# Patient Record
Sex: FEMALE | Race: OTHER | NOT HISPANIC OR LATINO
[De-identification: names, ages, dates, MRNs, and addresses within clinical notes are randomized per-mention and may not be internally consistent; named-entity substitution may affect disease eponyms.]

---

## 2020-07-31 ENCOUNTER — TRANSCRIPTION ENCOUNTER (OUTPATIENT)
Age: 40
End: 2020-07-31

## 2020-07-31 ENCOUNTER — APPOINTMENT (OUTPATIENT)
Dept: INTERNAL MEDICINE | Facility: CLINIC | Age: 40
End: 2020-07-31
Payer: MEDICAID

## 2020-07-31 VITALS
WEIGHT: 128 LBS | OXYGEN SATURATION: 100 % | HEIGHT: 66 IN | BODY MASS INDEX: 20.57 KG/M2 | SYSTOLIC BLOOD PRESSURE: 125 MMHG | DIASTOLIC BLOOD PRESSURE: 79 MMHG | TEMPERATURE: 98.5 F | HEART RATE: 90 BPM

## 2020-07-31 DIAGNOSIS — Z78.9 OTHER SPECIFIED HEALTH STATUS: ICD-10-CM

## 2020-07-31 PROCEDURE — 99203 OFFICE O/P NEW LOW 30 MIN: CPT

## 2020-07-31 NOTE — HISTORY OF PRESENT ILLNESS
[FreeTextEntry8] : c/o right breast lump  x 3/2020\par getting bigger \par size varies pending menses\par recently ecchymoses overlying lump\par occasional itchiness\par non tender\par no trauma\par no redness/swelling/vesicle/nipple discharge\par no nipple discharge \par no hormones/BCP\par no SOB/cough/hemoptysic/unexplained weight loss\par h/o right outer breast bx 2010- dx cyst\par FH breast cancer- maternal aunt and grand mother\par LMP yesterday\par OTC none\par work musician

## 2020-07-31 NOTE — REVIEW OF SYSTEMS
[Negative] : Psychiatric [Chest Pain] : no chest pain [Fever] : no fever [Shortness Of Breath] : no shortness of breath [Cough] : no cough [de-identified] : lump right breast

## 2020-07-31 NOTE — PHYSICAL EXAM
[Well Nourished] : well nourished [Normal Sclera/Conjunctiva] : normal sclera/conjunctiva [Normal Oropharynx] : the oropharynx was normal [No Lymphadenopathy] : no lymphadenopathy [Clear to Auscultation] : lungs were clear to auscultation bilaterally [Normal Rate] : normal rate  [Regular Rhythm] : with a regular rhythm [Pedal Pulses Present] : the pedal pulses are present [No Edema] : there was no peripheral edema [Non Tender] : non-tender [Soft] : abdomen soft [Normal] : no joint swelling and grossly normal strength and tone [No CVA Tenderness] : no CVA  tenderness [de-identified] : mobile  2 x 2 cm SQ mass inner lower right breast-  over lying ecchymoses- non tender- no nipple discharge - no mass axilla - normal left breast exam  [de-identified] : a

## 2020-08-07 ENCOUNTER — RESULT REVIEW (OUTPATIENT)
Age: 40
End: 2020-08-07

## 2020-08-07 ENCOUNTER — APPOINTMENT (OUTPATIENT)
Dept: ULTRASOUND IMAGING | Facility: CLINIC | Age: 40
End: 2020-08-07
Payer: MEDICAID

## 2020-08-07 ENCOUNTER — APPOINTMENT (OUTPATIENT)
Dept: MAMMOGRAPHY | Facility: CLINIC | Age: 40
End: 2020-08-07
Payer: MEDICAID

## 2020-08-07 ENCOUNTER — OUTPATIENT (OUTPATIENT)
Dept: OUTPATIENT SERVICES | Facility: HOSPITAL | Age: 40
LOS: 1 days | End: 2020-08-07

## 2020-08-07 PROCEDURE — 76641 ULTRASOUND BREAST COMPLETE: CPT | Mod: 26,50

## 2020-08-07 PROCEDURE — G0279: CPT | Mod: 26

## 2020-08-07 PROCEDURE — 77066 DX MAMMO INCL CAD BI: CPT | Mod: 26

## 2020-08-19 ENCOUNTER — APPOINTMENT (OUTPATIENT)
Dept: ULTRASOUND IMAGING | Facility: CLINIC | Age: 40
End: 2020-08-19
Payer: MEDICAID

## 2020-08-19 ENCOUNTER — RESULT REVIEW (OUTPATIENT)
Age: 40
End: 2020-08-19

## 2020-08-19 ENCOUNTER — OUTPATIENT (OUTPATIENT)
Dept: OUTPATIENT SERVICES | Facility: HOSPITAL | Age: 40
LOS: 1 days | End: 2020-08-19

## 2020-08-19 PROCEDURE — 88305 TISSUE EXAM BY PATHOLOGIST: CPT | Mod: 26

## 2020-08-19 PROCEDURE — 88360 TUMOR IMMUNOHISTOCHEM/MANUAL: CPT | Mod: 26,59

## 2020-08-19 PROCEDURE — 88344 IMHCHEM/IMCYTCHM EA MLT ANTB: CPT | Mod: 26,59

## 2020-08-19 PROCEDURE — 88342 IMHCHEM/IMCYTCHM 1ST ANTB: CPT | Mod: 26,59

## 2020-08-19 PROCEDURE — 88341 IMHCHEM/IMCYTCHM EA ADD ANTB: CPT | Mod: 26,59

## 2020-08-19 PROCEDURE — 88341 IMHCHEM/IMCYTCHM EA ADD ANTB: CPT | Mod: 59

## 2020-08-19 PROCEDURE — 88344 IMHCHEM/IMCYTCHM EA MLT ANTB: CPT | Mod: 59

## 2020-08-20 ENCOUNTER — RESULT REVIEW (OUTPATIENT)
Age: 40
End: 2020-08-20

## 2020-08-21 LAB — SURGICAL PATHOLOGY STUDY: SIGNIFICANT CHANGE UP

## 2020-08-27 ENCOUNTER — APPOINTMENT (OUTPATIENT)
Dept: BREAST CENTER | Facility: CLINIC | Age: 40
End: 2020-08-27
Payer: MEDICAID

## 2020-08-27 VITALS
HEART RATE: 90 BPM | HEIGHT: 66 IN | OXYGEN SATURATION: 99 % | DIASTOLIC BLOOD PRESSURE: 86 MMHG | SYSTOLIC BLOOD PRESSURE: 128 MMHG | BODY MASS INDEX: 20.09 KG/M2 | WEIGHT: 125 LBS | TEMPERATURE: 98.5 F

## 2020-08-27 DIAGNOSIS — Z80.3 FAMILY HISTORY OF MALIGNANT NEOPLASM OF BREAST: ICD-10-CM

## 2020-08-27 PROCEDURE — 99204 OFFICE O/P NEW MOD 45 MIN: CPT

## 2020-08-27 NOTE — REASON FOR VISIT
[Consultation] : a consultation visit [FreeTextEntry1] : newly diagnosed right breast invasive ductal carcinoma.

## 2020-08-27 NOTE — DATA REVIEWED
[FreeTextEntry1] : -- 8/7/2020 ([facility]) b/l dx mammo/US showing right 12:00 1cmFN 19 x 9 x 17mm solid nodule recommended for US core biopsy, right 4:00 4cmFN corresponding to palpable abnormality measuring 3.4 x 1.9 x 2.8cm for which US guided biopsy is recommended, solidy mass right 7:00 4cmFN measuring 13 x 5 x 13mm recc for US guided core bopsy, solid mass right 8:00 5cMFN measuring 4 x 5 x 5mm recc for US guided biopsy, right 9:00 5cmFN solid mass measuring 2.3 x 1.0 x 2.7cm for which US guided biopsy recc. A morphologically abnormal lymph node in right axilla with nodular thickened cortex measuring 3mm, recc for US guided biopsy. Left 2:00 4cmFN solid nodule measuring 7 x 10 x 6mm recc for US guided biopsy, several benign appearing solid nodules in left breast for which follow up can be performed pending biopsy results. BIRADS 4C\par \par -- 8/19/2020 ([facility]) right 4:00 4cmFN US biopsy pathology invasive ductal carcinoma with apocrine features, poorly differentiated, 1.8cm in greatest dimension. IHC: ER + [>95% 3+] [place] + [>95% 3+] HER2 negative [0%]. Right 7:00 5cmFN US core biopsy pathology fibroadenoma, Right 8:00 5cmFN US core biopsy pathology benign breast tissue with stromal fibrosis, right axilla US core biopsy pathology fragments of benign lymph node \par

## 2020-08-27 NOTE — PHYSICAL EXAM
[Normocephalic] : normocephalic [Atraumatic] : atraumatic [Supple] : supple [No Supraclavicular Adenopathy] : no supraclavicular adenopathy [Examined in the supine and seated position] : examined in the supine and seated position [No Nipple Retraction] : no left nipple retraction [No Axillary Lymphadenopathy] : no right axillary lymphadenopathy [No Nipple Discharge] : no left nipple discharge [No Edema] : no edema [No Rashes] : no rashes [No Ulceration] : no ulceration

## 2020-08-27 NOTE — HISTORY OF PRESENT ILLNESS
[FreeTextEntry1] : Jenn is a 39 year year old female referred by Dr. Jones/Esau presents for initial evaluation regarding newly diagnosed right 4:00 4cmFN invasive ductal carcinoma (ER + [>95% 3+] [place] + [>95% 3+] HER2 negative [0%]) poorly differentiated. In March the patient palpated a right breast lump at 4 o'clock, prompting imaging and subsequent biopsy. Patient had bleeding issue post biopsy, at that time I saw her at imaging. After pinpoint compression and ace bandage the bleeding stopped.\par \par Discussed patient's new diagnosis of cancer and her family history. Discussed local therapy which would include breast and axillary surgery, depending on results of MRI. Patient has bilateral breast biopsies pending, however will postpone those until after the MRI. Discussed the possibility of radiation therapy depending on the surgery and the axillary final path. Also discussed systemic therapy including endocrine therapy as her tumor is ER+, and chemotherapy depending on the Ki67 (which is pending), final pathology and medical oncology recommendations. 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 Discussed the Sozo noninvasive bioimpedance spectroscopy device which we will be able to measure and follow her after treatment, this way we can catch subclinical lymphedema and treat it before it becomes permanent. Patient understood.\par \par Discussed importance and implication of genetic testing in regards to her family history, new diagnosis of cancer at a young age and possibility of changing her treatment plan. Patient would like to go forward with genetic testing.\par

## 2020-08-27 NOTE — REVIEW OF SYSTEMS
[Breast Pain] : breast pain [Breast Lump] : breast lump [As Noted in HPI] : as noted in HPI [Negative] : Heme/Lymph [de-identified] : ecchymosis at biopsy site in the right breast; occasional and intermittent mild breast pain round the lump

## 2020-08-28 ENCOUNTER — APPOINTMENT (OUTPATIENT)
Dept: ULTRASOUND IMAGING | Facility: CLINIC | Age: 40
End: 2020-08-28

## 2020-08-31 ENCOUNTER — RESULT REVIEW (OUTPATIENT)
Age: 40
End: 2020-08-31

## 2020-08-31 ENCOUNTER — APPOINTMENT (OUTPATIENT)
Dept: MRI IMAGING | Facility: CLINIC | Age: 40
End: 2020-08-31
Payer: MEDICAID

## 2020-08-31 ENCOUNTER — OUTPATIENT (OUTPATIENT)
Dept: OUTPATIENT SERVICES | Facility: HOSPITAL | Age: 40
LOS: 1 days | End: 2020-08-31

## 2020-08-31 PROCEDURE — 77049 MRI BREAST C-+ W/CAD BI: CPT | Mod: 26

## 2020-09-01 DIAGNOSIS — R92.8 OTHER ABNORMAL AND INCONCLUSIVE FINDINGS ON DIAGNOSTIC IMAGING OF BREAST: ICD-10-CM

## 2020-09-15 ENCOUNTER — RESULT REVIEW (OUTPATIENT)
Age: 40
End: 2020-09-15

## 2020-09-15 ENCOUNTER — APPOINTMENT (OUTPATIENT)
Dept: ULTRASOUND IMAGING | Facility: CLINIC | Age: 40
End: 2020-09-15
Payer: MEDICAID

## 2020-09-15 ENCOUNTER — OUTPATIENT (OUTPATIENT)
Dept: OUTPATIENT SERVICES | Facility: HOSPITAL | Age: 40
LOS: 1 days | End: 2020-09-15

## 2020-09-15 ENCOUNTER — APPOINTMENT (OUTPATIENT)
Dept: ULTRASOUND IMAGING | Facility: CLINIC | Age: 40
End: 2020-09-15

## 2020-09-15 PROCEDURE — 88305 TISSUE EXAM BY PATHOLOGIST: CPT | Mod: 26

## 2020-09-15 PROCEDURE — 19083 BX BREAST 1ST LESION US IMAG: CPT | Mod: LT

## 2020-09-15 PROCEDURE — 88360 TUMOR IMMUNOHISTOCHEM/MANUAL: CPT | Mod: 26

## 2020-09-15 PROCEDURE — 88344 IMHCHEM/IMCYTCHM EA MLT ANTB: CPT | Mod: 26,59

## 2020-09-15 PROCEDURE — 19084 BX BREAST ADD LESION US IMAG: CPT | Mod: 59,RT

## 2020-09-15 PROCEDURE — 77066 DX MAMMO INCL CAD BI: CPT | Mod: 26

## 2020-09-15 PROCEDURE — 88342 IMHCHEM/IMCYTCHM 1ST ANTB: CPT | Mod: 26,59

## 2020-09-17 ENCOUNTER — OUTPATIENT (OUTPATIENT)
Dept: OUTPATIENT SERVICES | Facility: HOSPITAL | Age: 40
LOS: 1 days | End: 2020-09-17
Payer: COMMERCIAL

## 2020-09-17 LAB
GLUCOSE BLDC GLUCOMTR-MCNC: 91 MG/DL — SIGNIFICANT CHANGE UP (ref 70–99)
SURGICAL PATHOLOGY STUDY: SIGNIFICANT CHANGE UP

## 2020-09-17 PROCEDURE — 78815 PET IMAGE W/CT SKULL-THIGH: CPT

## 2020-09-17 PROCEDURE — A9552: CPT

## 2020-09-17 PROCEDURE — 78815 PET IMAGE W/CT SKULL-THIGH: CPT | Mod: 26

## 2020-09-17 PROCEDURE — 82962 GLUCOSE BLOOD TEST: CPT

## 2020-09-21 ENCOUNTER — APPOINTMENT (OUTPATIENT)
Dept: BREAST CENTER | Facility: CLINIC | Age: 40
End: 2020-09-21
Payer: MEDICAID

## 2020-09-21 VITALS — BODY MASS INDEX: 20.09 KG/M2 | WEIGHT: 125 LBS | HEIGHT: 66 IN | HEART RATE: 110 BPM | OXYGEN SATURATION: 98 %

## 2020-09-21 PROCEDURE — 99214 OFFICE O/P EST MOD 30 MIN: CPT

## 2020-09-21 NOTE — REASON FOR VISIT
[Follow-Up: _____] : a [unfilled] follow-up visit [FreeTextEntry1] :  right 4:00 4cmFN invasive ductal carcinoma (ER + [>95% 3+] [place] + [>95% 3+] HER2 negative [0%]) poorly differentiated.

## 2020-09-21 NOTE — HISTORY OF PRESENT ILLNESS
[FreeTextEntry1] : Jenn is a 39 year year old female, accompanied by her friend Beatriz (she is a primary care physician in [place]), presents for follow up evaluation regarding right 4:00 4cmFN invasive ductal carcinoma (ER + [>95% 3+] [place] + [>95% 3+] HER2 negative [0%]) poorly differentiated and right 12 o'clock ADH borderline DCIS.\par \par To note, patient has undergone additional biopsies in her right and left breast on 9/15/2020 of which pathology yielded right 12:00 1cmFN ADH bordering on low grade DCIS arising in background of FEA and involving complex sclerosing lesion. She has consulted with Dr. Mirella Samson and underwent a PET/CT scan which identified the cancer localized to the breast with no evidence of metastatic disease.  \par \par Discussed the recommendation after discussion with Dr. Samson for proceeding with surgical treatment. \par

## 2020-09-21 NOTE — DATA REVIEWED
[FreeTextEntry1] : -- 8/7/2020 ([facility]) b/l dx mammo/US showing right 12:00 1cmFN 19 x 9 x 17mm solid nodule recommended for US core biopsy, right 4:00 4cmFN corresponding to palpable abnormality measuring 3.4 x 1.9 x 2.8cm for which US guided biopsy is recommended, solidy mass right 7:00 4cmFN measuring 13 x 5 x 13mm recc for US guided core bopsy, solid mass right 8:00 5cMFN measuring 4 x 5 x 5mm recc for US guided biopsy, right 9:00 5cmFN solid mass measuring 2.3 x 1.0 x 2.7cm for which US guided biopsy recc. A morphologically abnormal lymph node in right axilla with nodular thickened cortex measuring 3mm, recc for US guided biopsy. Left 2:00 4cmFN solid nodule measuring 7 x 10 x 6mm recc for US guided biopsy, several benign appearing solid nodules in left breast for which follow up can be performed pending biopsy results. BIRADS 4C\par \par -- 8/19/2020 ([facility]) right 4:00 4cmFN US biopsy pathology invasive ductal carcinoma with apocrine features, poorly differentiated, 1.8cm in greatest dimension. IHC: ER + [>95% 3+] [place] + [>95% 3+] HER2 negative [0%]. Right 7:00 5cmFN US core biopsy pathology fibroadenoma, Right 8:00 5cmFN US core biopsy pathology benign breast tissue with stromal fibrosis, right axilla US core biopsy pathology fragments of benign lymph node \par  \par --9/2/2020 ([facility]) b/l breast MRI showing known 3cm biopsy proven carcinoma at 4:00 position of right breast demonstrating minimal enhancement and evidence of hemorrhage. The mass extends from the chest wall to subcutaneous dense tissues with loss of fat planes but no overt invasion. 2 lobulated masses with benign enhancement in upper quadrant right breast recommended for US guided biopsies. BIRADS 6 \par \par --9/15/2020 ([facility]) right 12:00 1cmFN US guided biopsy pathology ADH bordering on low grade DCIS arising in background of FEA and involving complex sclerosing lesion. Right 9:00 5cmFN US guided biopsy pathology fibroadenomatoid changes and sclerosing adenosis. Left breast 2:00 4cmFN US guided biopsy pathology fibroadenoma\par \par --9/17/2020 ([facility]) PET/CT Skull to thigh showing FDG avid right breast mass consistent with breast cancer. Two other FDG avid lesions are seen in the lateral right breast and were recently biopsied. No metastatic disease.\par \par

## 2020-09-21 NOTE — PAST MEDICAL HISTORY
[Menstruating] : The patient is menstruating [Menarche Age ____] : age at menarche was [unfilled] [Definite ___ (Date)] : the last menstrual period was [unfilled] [Regular Cycle Intervals] : have been regular [Total Preg ___] : G[unfilled] [History of Hormone Replacement Treatment] : has no history of hormone replacement treatment [FreeTextEntry6] : none [FreeTextEntry7] : none

## 2020-09-21 NOTE — REVIEW OF SYSTEMS
[As Noted in HPI] : as noted in HPI [Breast Pain] : breast pain [Breast Lump] : breast lump [Negative] : Heme/Lymph [de-identified] : ecchymosis at biopsy site in the right breast; occasional and intermittent mild breast pain round the lump

## 2020-09-25 ENCOUNTER — NON-APPOINTMENT (OUTPATIENT)
Age: 40
End: 2020-09-25

## 2020-09-25 ENCOUNTER — APPOINTMENT (OUTPATIENT)
Dept: INTERNAL MEDICINE | Facility: CLINIC | Age: 40
End: 2020-09-25
Payer: MEDICAID

## 2020-09-25 ENCOUNTER — OUTPATIENT (OUTPATIENT)
Dept: OUTPATIENT SERVICES | Facility: HOSPITAL | Age: 40
LOS: 1 days | End: 2020-09-25
Payer: MEDICAID

## 2020-09-25 ENCOUNTER — APPOINTMENT (OUTPATIENT)
Dept: RADIOLOGY | Facility: CLINIC | Age: 40
End: 2020-09-25

## 2020-09-25 VITALS
DIASTOLIC BLOOD PRESSURE: 84 MMHG | HEART RATE: 84 BPM | SYSTOLIC BLOOD PRESSURE: 129 MMHG | BODY MASS INDEX: 19.77 KG/M2 | WEIGHT: 123 LBS | HEIGHT: 66 IN | TEMPERATURE: 98.7 F | OXYGEN SATURATION: 99 %

## 2020-09-25 DIAGNOSIS — Z01.818 ENCOUNTER FOR OTHER PREPROCEDURAL EXAMINATION: ICD-10-CM

## 2020-09-25 DIAGNOSIS — N63.10 UNSPECIFIED LUMP IN THE RIGHT BREAST, UNSPECIFIED QUADRANT: ICD-10-CM

## 2020-09-25 PROCEDURE — 36415 COLL VENOUS BLD VENIPUNCTURE: CPT

## 2020-09-25 PROCEDURE — 71046 X-RAY EXAM CHEST 2 VIEWS: CPT | Mod: 26

## 2020-09-25 PROCEDURE — 99214 OFFICE O/P EST MOD 30 MIN: CPT | Mod: 25

## 2020-09-25 PROCEDURE — 93000 ELECTROCARDIOGRAM COMPLETE: CPT

## 2020-09-27 PROBLEM — Z01.818 PRE-OP EVALUATION: Status: ACTIVE | Noted: 2020-09-25

## 2020-09-27 LAB
ALBUMIN SERPL ELPH-MCNC: 4.9 G/DL
ALP BLD-CCNC: 57 U/L
ALT SERPL-CCNC: 24 U/L
ANION GAP SERPL CALC-SCNC: 17 MMOL/L
APPEARANCE: ABNORMAL
APTT BLD: 34 SEC
AST SERPL-CCNC: 27 U/L
BACTERIA: NEGATIVE
BASOPHILS # BLD AUTO: 0.05 K/UL
BASOPHILS NFR BLD AUTO: 1.3 %
BILIRUB SERPL-MCNC: 0.5 MG/DL
BILIRUBIN URINE: NEGATIVE
BLOOD URINE: NEGATIVE
BUN SERPL-MCNC: 9 MG/DL
CALCIUM SERPL-MCNC: 9.5 MG/DL
CHLORIDE SERPL-SCNC: 104 MMOL/L
CO2 SERPL-SCNC: 20 MMOL/L
COLOR: ABNORMAL
CREAT SERPL-MCNC: 0.63 MG/DL
EOSINOPHIL # BLD AUTO: 0.13 K/UL
EOSINOPHIL NFR BLD AUTO: 3.4 %
GLUCOSE QUALITATIVE U: NEGATIVE
GLUCOSE SERPL-MCNC: 88 MG/DL
HCG SERPL QL: NEGATIVE
HCT VFR BLD CALC: 39.2 %
HGB BLD-MCNC: 12.6 G/DL
HYALINE CASTS: 3 /LPF
IMM GRANULOCYTES NFR BLD AUTO: 0.3 %
INR PPP: 0.96 RATIO
KETONES URINE: ABNORMAL
LEUKOCYTE ESTERASE URINE: NEGATIVE
LYMPHOCYTES # BLD AUTO: 1.54 K/UL
LYMPHOCYTES NFR BLD AUTO: 40.3 %
MAN DIFF?: NORMAL
MCHC RBC-ENTMCNC: 31.4 PG
MCHC RBC-ENTMCNC: 32.1 GM/DL
MCV RBC AUTO: 97.8 FL
MICROSCOPIC-UA: NORMAL
MONOCYTES # BLD AUTO: 0.31 K/UL
MONOCYTES NFR BLD AUTO: 8.1 %
NEUTROPHILS # BLD AUTO: 1.78 K/UL
NEUTROPHILS NFR BLD AUTO: 46.6 %
NITRITE URINE: NEGATIVE
PAPP-A SERPL-ACNC: <1 MIU/ML
PH URINE: 5.5
PLATELET # BLD AUTO: 248 K/UL
POTASSIUM SERPL-SCNC: 4.1 MMOL/L
PROT SERPL-MCNC: 7.3 G/DL
PROTEIN URINE: NORMAL
PT BLD: 11.3 SEC
RBC # BLD: 4.01 M/UL
RBC # FLD: 12.8 %
RED BLOOD CELLS URINE: 1 /HPF
SODIUM SERPL-SCNC: 141 MMOL/L
SPECIFIC GRAVITY URINE: 1.02
SQUAMOUS EPITHELIAL CELLS: 3 /HPF
UROBILINOGEN URINE: NORMAL
WBC # FLD AUTO: 3.82 K/UL
WHITE BLOOD CELLS URINE: 0 /HPF

## 2020-09-27 NOTE — ASSESSMENT
[High Risk Surgery - Intraperitoneal, Intrathoracic or Supringuinal Vascular Procedures] : High Risk Surgery - Intraperitoneal, Intrathoracic or Supringuinal Vascular Procedures - No (0) [Ischemic Heart Disease] : Ischemic Heart Disease - No (0) [Congestive Heart Failure] : Congestive Heart Failure - No (0) [Prior Cerebrovascular Accident or TIA] : Prior Cerebrovascular Accident or TIA - No (0) [Creatinine >= 2mg/dL (1 Point)] : Creatinine >= 2mg/dL - No (0) [Insulin-dependent Diabetic (1 Point)] : Insulin-dependent Diabetic - No (0) [0] : 0 , RCRI Class: I, Risk of Post-Op Cardiac Complications: 3.9%, 95% CI for Risk Estimate: 2.8% - 5.4% [Patient Optimized for Surgery] : Patient optimized for surgery [As per surgery] : as per surgery

## 2020-09-27 NOTE — PHYSICAL EXAM
[Well Nourished] : well nourished [Normal Sclera/Conjunctiva] : normal sclera/conjunctiva [Normal Oropharynx] : the oropharynx was normal [No Lymphadenopathy] : no lymphadenopathy [Clear to Auscultation] : lungs were clear to auscultation bilaterally [Normal Rate] : normal rate  [Regular Rhythm] : with a regular rhythm [Pedal Pulses Present] : the pedal pulses are present [No Edema] : there was no peripheral edema [Soft] : abdomen soft [Non Tender] : non-tender [No CVA Tenderness] : no CVA  tenderness [No Rash] : no rash [Normal] : affect was normal and insight and judgment were intact [de-identified] : ecchymoses/tenderness right upper inner breast  [de-identified] : a

## 2020-09-27 NOTE — HISTORY OF PRESENT ILLNESS
[No Pertinent Cardiac History] : no history of aortic stenosis, atrial fibrillation, coronary artery disease, recent myocardial infarction, or implantable device/pacemaker [No Pertinent Pulmonary History] : no history of asthma, COPD, sleep apnea, or smoking [No Adverse Anesthesia Reaction] : no adverse anesthesia reaction in self or family member [Chronic Anticoagulation] : no chronic anticoagulation [Chronic Kidney Disease] : no chronic kidney disease [Diabetes] : no diabetes [FreeTextEntry1] : right mastectomy, SLNB [FreeTextEntry2] : 10/1/2020 [FreeTextEntry3] : Dr Otero

## 2020-10-01 ENCOUNTER — TRANSCRIPTION ENCOUNTER (OUTPATIENT)
Age: 40
End: 2020-10-01

## 2020-10-01 ENCOUNTER — OUTPATIENT (OUTPATIENT)
Dept: OUTPATIENT SERVICES | Facility: HOSPITAL | Age: 40
LOS: 1 days | End: 2020-10-01
Payer: COMMERCIAL

## 2020-10-01 ENCOUNTER — RESULT REVIEW (OUTPATIENT)
Age: 40
End: 2020-10-01

## 2020-10-01 PROCEDURE — 38792 RA TRACER ID OF SENTINL NODE: CPT

## 2020-10-02 ENCOUNTER — RESULT REVIEW (OUTPATIENT)
Age: 40
End: 2020-10-02

## 2020-10-02 ENCOUNTER — APPOINTMENT (OUTPATIENT)
Dept: BREAST CENTER | Facility: CLINIC | Age: 40
End: 2020-10-02

## 2020-10-02 ENCOUNTER — OUTPATIENT (OUTPATIENT)
Dept: OUTPATIENT SERVICES | Facility: HOSPITAL | Age: 40
LOS: 1 days | Discharge: HOME | End: 2020-10-02
Payer: MEDICAID

## 2020-10-02 PROCEDURE — A9541: CPT

## 2020-10-02 PROCEDURE — 88331 PATH CONSLTJ SURG 1 BLK 1SPC: CPT | Mod: 26

## 2020-10-02 PROCEDURE — 19303 MAST SIMPLE COMPLETE: CPT | Mod: RT,GC

## 2020-10-02 PROCEDURE — 38525 BIOPSY/REMOVAL LYMPH NODES: CPT | Mod: RT,GC

## 2020-10-02 PROCEDURE — 38792 RA TRACER ID OF SENTINL NODE: CPT

## 2020-10-02 PROCEDURE — 88305 TISSUE EXAM BY PATHOLOGIST: CPT | Mod: 26

## 2020-10-02 PROCEDURE — 88307 TISSUE EXAM BY PATHOLOGIST: CPT | Mod: 26

## 2020-10-12 ENCOUNTER — APPOINTMENT (OUTPATIENT)
Dept: BREAST CENTER | Facility: CLINIC | Age: 40
End: 2020-10-12
Payer: MEDICAID

## 2020-10-12 DIAGNOSIS — Z80.3 FAMILY HISTORY OF MALIGNANT NEOPLASM OF BREAST: ICD-10-CM

## 2020-10-12 LAB — SURGICAL PATHOLOGY STUDY: SIGNIFICANT CHANGE UP

## 2020-10-12 PROCEDURE — 99024 POSTOP FOLLOW-UP VISIT: CPT

## 2020-10-12 NOTE — PHYSICAL EXAM
[Normocephalic] : normocephalic [Atraumatic] : atraumatic [Supple] : supple [No Supraclavicular Adenopathy] : no supraclavicular adenopathy [Examined in the supine and seated position] : examined in the supine and seated position [No Nipple Retraction] : no left nipple retraction [No Nipple Discharge] : no left nipple discharge [No Axillary Lymphadenopathy] : no left axillary lymphadenopathy [No Edema] : no edema [No Rashes] : no rashes [No Ulceration] : no ulceration [No dominant masses] : no dominant masses in right breast  [No dominant masses] : no dominant masses left breast

## 2020-10-12 NOTE — DATA REVIEWED
[FreeTextEntry1] : -- 8/7/2020 ([facility]) b/l dx mammo/US showing right 12:00 1cmFN 19 x 9 x 17mm solid nodule recommended for US core biopsy, right 4:00 4cmFN corresponding to palpable abnormality measuring 3.4 x 1.9 x 2.8cm for which US guided biopsy is recommended, solidy mass right 7:00 4cmFN measuring 13 x 5 x 13mm recc for US guided core bopsy, solid mass right 8:00 5cMFN measuring 4 x 5 x 5mm recc for US guided biopsy, right 9:00 5cmFN solid mass measuring 2.3 x 1.0 x 2.7cm for which US guided biopsy recc. A morphologically abnormal lymph node in right axilla with nodular thickened cortex measuring 3mm, recc for US guided biopsy. Left 2:00 4cmFN solid nodule measuring 7 x 10 x 6mm recc for US guided biopsy, several benign appearing solid nodules in left breast for which follow up can be performed pending biopsy results. BIRADS 4C\par \par -- 8/19/2020 ([facility]) right 4:00 4cmFN US biopsy pathology invasive ductal carcinoma with apocrine features, poorly differentiated, 1.8cm in greatest dimension. IHC: ER + [>95% 3+] [place] + [>95% 3+] HER2 negative [0%]. Right 7:00 5cmFN US core biopsy pathology fibroadenoma, Right 8:00 5cmFN US core biopsy pathology benign breast tissue with stromal fibrosis, right axilla US core biopsy pathology fragments of benign lymph node \par  \par --9/2/2020 ([facility]) b/l breast MRI showing known 3cm biopsy proven carcinoma at 4:00 position of right breast demonstrating minimal enhancement and evidence of hemorrhage. The mass extends from the chest wall to subcutaneous dense tissues with loss of fat planes but no overt invasion. 2 lobulated masses with benign enhancement in upper quadrant right breast recommended for US guided biopsies. BIRADS 6 \par \par --9/15/2020 ([facility]) right 12:00 1cmFN US guided biopsy pathology ADH bordering on low grade DCIS arising in background of FEA and involving complex sclerosing lesion. Right 9:00 5cmFN US guided biopsy pathology fibroadenomatoid changes and sclerosing adenosis. Left breast 2:00 4cmFN US guided biopsy pathology fibroadenoma\par \par --9/17/2020 ([facility]) PET/CT Skull to thigh showing FDG avid right breast mass consistent with breast cancer. Two other FDG avid lesions are seen in the lateral right breast and were recently biopsied. No metastatic disease.\par \par --10/2/2020 final surgical pathology PENDING\par \par \par \par Baseline L-dx measurements obtained today \par

## 2020-10-12 NOTE — REASON FOR VISIT
[Post Op: _________] : a [unfilled] post op visit [FreeTextEntry1] : s/p right mastectomy with SLNB on 10/2/2020 for treatment of right 4:00 4cmFN invasive ductal carcinoma (ER + [>95% 3+] [place] + [>95% 3+] HER2 negative [0%]) poorly differentiated and right 12 o'clock ADH borderline DCIS. Final surgical pathology pending

## 2020-10-12 NOTE — REVIEW OF SYSTEMS
[As Noted in HPI] : as noted in HPI [Breast Pain] : breast pain [Breast Lump] : breast lump [Negative] : Heme/Lymph [de-identified] : ecchymosis at biopsy site in the right breast; occasional and intermittent mild breast pain round the lump

## 2020-10-12 NOTE — HISTORY OF PRESENT ILLNESS
[FreeTextEntry1] : Jenn is a 39 year year old female who presents for post-op evaluation. She is s/p right mastectomy with SLNB on 10/2/2020 for treatment of right 4:00 4cmFN invasive ductal carcinoma (ER + [>95% 3+] [place] + [>95% 3+] HER2 negative [0%]) poorly differentiated and right 12 o'clock ADH borderline DCIS. Final surgical pathology showed 2.4cm right IDC, moderately differentiated with negative margins, 0/3 SLNs. She is doing well, overall, PORFIRIO drain with minimal output. Denies fever and chills.\par \par PORFIRIO drain was removed today.\par \par Prior to surgery, she met with Dr. Mirella Samson and underwent a PET/CT scan which identified the cancer localized to the breast with no evidence of metastatic disease.  Patient has follow up with her this week.\par \par Patient will also follow up with Dr. Madden, for a delayed reconstruction.\par \par \par

## 2020-10-28 ENCOUNTER — NON-APPOINTMENT (OUTPATIENT)
Age: 40
End: 2020-10-28

## 2020-11-16 ENCOUNTER — APPOINTMENT (OUTPATIENT)
Dept: BREAST CENTER | Facility: CLINIC | Age: 40
End: 2020-11-16
Payer: MEDICAID

## 2020-11-16 VITALS — OXYGEN SATURATION: 97 % | WEIGHT: 123 LBS | HEIGHT: 66 IN | BODY MASS INDEX: 19.77 KG/M2 | HEART RATE: 76 BPM

## 2020-11-16 PROCEDURE — 99024 POSTOP FOLLOW-UP VISIT: CPT

## 2020-11-16 NOTE — REASON FOR VISIT
[Post Op: _________] : a [unfilled] post op visit [FreeTextEntry1] :  right 4:00 4cmFN invasive ductal carcinoma moderately differentiated, 2.4cm in size, ER >95%, [place] >95% HER2 negative and right 12 o'clock ADH borderline DCIS. S/p right mastectomy with SLNB, 0/3 lymph nodes were positive for disease. s/p right mastectomy and right SLN

## 2020-11-16 NOTE — REVIEW OF SYSTEMS
[As Noted in HPI] : as noted in HPI [Breast Pain] : breast pain [Breast Lump] : breast lump [Negative] : Heme/Lymph [de-identified] : ecchymosis at biopsy site in the right breast; occasional and intermittent mild breast pain round the lump

## 2020-11-16 NOTE — DATA REVIEWED
[FreeTextEntry1] : -- 8/7/2020 ([facility]) b/l dx mammo/US showing right 12:00 1cmFN 19 x 9 x 17mm solid nodule recommended for US core biopsy, right 4:00 4cmFN corresponding to palpable abnormality measuring 3.4 x 1.9 x 2.8cm for which US guided biopsy is recommended, solidy mass right 7:00 4cmFN measuring 13 x 5 x 13mm recc for US guided core bopsy, solid mass right 8:00 5cMFN measuring 4 x 5 x 5mm recc for US guided biopsy, right 9:00 5cmFN solid mass measuring 2.3 x 1.0 x 2.7cm for which US guided biopsy recc. A morphologically abnormal lymph node in right axilla with nodular thickened cortex measuring 3mm, recc for US guided biopsy. Left 2:00 4cmFN solid nodule measuring 7 x 10 x 6mm recc for US guided biopsy, several benign appearing solid nodules in left breast for which follow up can be performed pending biopsy results. BIRADS 4C\par \par -- 8/19/2020 ([facility]) right 4:00 4cmFN US biopsy pathology invasive ductal carcinoma with apocrine features, poorly differentiated, 1.8cm in greatest dimension. IHC: ER + [>95% 3+] [place] + [>95% 3+] HER2 negative [0%]. Right 7:00 5cmFN US core biopsy pathology fibroadenoma, Right 8:00 5cmFN US core biopsy pathology benign breast tissue with stromal fibrosis, right axilla US core biopsy pathology fragments of benign lymph node \par  \par --9/2/2020 ([facility]) b/l breast MRI showing known 3cm biopsy proven carcinoma at 4:00 position of right breast demonstrating minimal enhancement and evidence of hemorrhage. The mass extends from the chest wall to subcutaneous dense tissues with loss of fat planes but no overt invasion. 2 lobulated masses with benign enhancement in upper quadrant right breast recommended for US guided biopsies. BIRADS 6 \par \par --9/15/2020 ([facility]) right 12:00 1cmFN US guided biopsy pathology ADH bordering on low grade DCIS arising in background of FEA and involving complex sclerosing lesion. Right 9:00 5cmFN US guided biopsy pathology fibroadenomatoid changes and sclerosing adenosis. Left breast 2:00 4cmFN US guided biopsy pathology fibroadenoma\par \par --9/17/2020 ([facility]) PET/CT Skull to thigh showing FDG avid right breast mass consistent with breast cancer. Two other FDG avid lesions are seen in the lateral right breast and were recently biopsied. No metastatic disease.\par \par --10/2/2020 final surgical pathology PENDING\par \par \par \par Baseline L-dx measurements obtained today \par

## 2020-11-16 NOTE — HISTORY OF PRESENT ILLNESS
[FreeTextEntry1] : Jenn is a 39 year year old female who presents for post-op evaluation for right 4:00 4cmFN invasive ductal carcinoma moderately differentiated, 2.4cm in size, ER >95%, [place] >95% HER2 negative and right 12 o'clock ADH borderline DCIS. S/p right mastectomy with SLNB, 0/3 lymph nodes were positive for disease on 10/2/2020. She is doing well, reports some tightness with ROM and with playing the violin. Denies fever and chills.\par \par She has followed up Dr. Mirella Samson who sent for Oncotype, which is pending. She underwent a PET/CT scan which identified the cancer localized to the breast with no evidence of metastatic disease.\par \par Patient will also follow up with Dr. Madden, for a delayed reconstruction.

## 2020-11-16 NOTE — PHYSICAL EXAM
[Normocephalic] : normocephalic [Atraumatic] : atraumatic [Supple] : supple [No Supraclavicular Adenopathy] : no supraclavicular adenopathy [Examined in the supine and seated position] : examined in the supine and seated position [No dominant masses] : no dominant masses left breast [No Nipple Retraction] : no left nipple retraction [No Nipple Discharge] : no left nipple discharge [No Axillary Lymphadenopathy] : no left axillary lymphadenopathy [No Edema] : no edema [No Rashes] : no rashes [No Ulceration] : no ulceration [No dominant masses] : no dominant masses in right breast

## 2021-03-01 ENCOUNTER — APPOINTMENT (OUTPATIENT)
Dept: BREAST CENTER | Facility: CLINIC | Age: 41
End: 2021-03-01
Payer: MEDICAID

## 2021-03-01 VITALS — WEIGHT: 123 LBS | HEART RATE: 84 BPM | OXYGEN SATURATION: 99 % | BODY MASS INDEX: 19.77 KG/M2 | HEIGHT: 66 IN

## 2021-03-01 DIAGNOSIS — Z90.11 ACQUIRED ABSENCE OF RIGHT BREAST AND NIPPLE: ICD-10-CM

## 2021-03-01 PROCEDURE — 99072 ADDL SUPL MATRL&STAF TM PHE: CPT

## 2021-03-01 PROCEDURE — 99213 OFFICE O/P EST LOW 20 MIN: CPT

## 2021-03-01 RX ORDER — TAMOXIFEN CITRATE 20 MG/1
20 TABLET, FILM COATED ORAL
Refills: 0 | Status: ACTIVE | COMMUNITY

## 2021-03-01 RX ORDER — OXYCODONE 5 MG/1
5 TABLET ORAL
Qty: 20 | Refills: 0 | Status: DISCONTINUED | COMMUNITY
Start: 2020-10-02 | End: 2021-03-01

## 2021-03-02 NOTE — DATA REVIEWED
[FreeTextEntry1] : -- 8/7/2020 ([facility]) b/l dx mammo/US showing right 12:00 1cmFN 19 x 9 x 17mm solid nodule recommended for US core biopsy, right 4:00 4cmFN corresponding to palpable abnormality measuring 3.4 x 1.9 x 2.8cm for which US guided biopsy is recommended, solidy mass right 7:00 4cmFN measuring 13 x 5 x 13mm recc for US guided core bopsy, solid mass right 8:00 5cMFN measuring 4 x 5 x 5mm recc for US guided biopsy, right 9:00 5cmFN solid mass measuring 2.3 x 1.0 x 2.7cm for which US guided biopsy recc. A morphologically abnormal lymph node in right axilla with nodular thickened cortex measuring 3mm, recc for US guided biopsy. Left 2:00 4cmFN solid nodule measuring 7 x 10 x 6mm recc for US guided biopsy, several benign appearing solid nodules in left breast for which follow up can be performed pending biopsy results. BIRADS 4C\par \par -- 8/19/2020 ([facility]) right 4:00 4cmFN US biopsy pathology invasive ductal carcinoma with apocrine features, poorly differentiated, 1.8cm in greatest dimension. IHC: ER + [>95% 3+] [place] + [>95% 3+] HER2 negative [0%]. Right 7:00 5cmFN US core biopsy pathology fibroadenoma, Right 8:00 5cmFN US core biopsy pathology benign breast tissue with stromal fibrosis, right axilla US core biopsy pathology fragments of benign lymph node \par  \par --9/2/2020 ([facility]) b/l breast MRI showing known 3cm biopsy proven carcinoma at 4:00 position of right breast demonstrating minimal enhancement and evidence of hemorrhage. The mass extends from the chest wall to subcutaneous dense tissues with loss of fat planes but no overt invasion. 2 lobulated masses with benign enhancement in upper quadrant right breast recommended for US guided biopsies. BIRADS 6 \par \par --9/15/2020 ([facility]) right 12:00 1cmFN US guided biopsy pathology ADH bordering on low grade DCIS arising in background of FEA and involving complex sclerosing lesion. Right 9:00 5cmFN US guided biopsy pathology fibroadenomatoid changes and sclerosing adenosis. Left breast 2:00 4cmFN US guided biopsy pathology fibroadenoma\par \par --9/17/2020 ([facility]) PET/CT Skull to thigh showing FDG avid right breast mass consistent with breast cancer. Two other FDG avid lesions are seen in the lateral right breast and were recently biopsied. No metastatic disease.\par \par 10/12/2020 ([facility]) right mastectomy pathology: invasive moderately differentiated ductal carcinoma, 2.4cm in size. Margins negative for invasive carcinoma, 2mm from anterior margin, 3mm from deep margin, ALH. Right breast 4:00 margin: benign fibroadipose tissue. Right breast axillary tail: benign breast tissue. Right breast axillary node: two lymph nodes negative for tumor. Right SLN: one lymph node negative for tumor. \par \par Baseline L-dx measurements obtained \par

## 2021-03-02 NOTE — HISTORY OF PRESENT ILLNESS
[FreeTextEntry1] : Jenn is a 40 year year old female who presents for post-op evaluation for right 4:00 4cmFN invasive ductal carcinoma moderately differentiated, 2.4cm in size, ER >95%, [place] >95% HER2 negative and right 12 o'clock ADH borderline DCIS. S/p right nipple sparing mastectomy, with no recon, with SLNB, 0/3 lymph nodes were positive for disease on 10/2/2020. She is doing well, reports improvement with ongoing physical therapy and with playing the violin. Denies fever and chills. \par \par Oncotype of 23.  She started Tamoxifen on 2/22/2021 managed by Dr. Samson, but reports taking half a pill or one pill every two days secondary to headaches and feelings of swelling in her fingers. She underwent a PET/CT scan which identified the cancer localized to the breast with no evidence of metastatic disease.\par \par Patient currently does not wish to undergo reconstruction and inquired about a prosthetic bra.

## 2021-03-02 NOTE — REVIEW OF SYSTEMS
[As Noted in HPI] : as noted in HPI [Breast Pain] : breast pain [Breast Lump] : breast lump [Negative] : Heme/Lymph [de-identified] : ecchymosis at biopsy site in the right breast; occasional and intermittent mild breast pain round the lump

## 2021-06-07 ENCOUNTER — NON-APPOINTMENT (OUTPATIENT)
Age: 41
End: 2021-06-07

## 2021-06-07 ENCOUNTER — APPOINTMENT (OUTPATIENT)
Dept: BREAST CENTER | Facility: CLINIC | Age: 41
End: 2021-06-07

## 2021-06-07 VITALS — OXYGEN SATURATION: 98 % | HEART RATE: 93 BPM

## 2021-06-07 NOTE — REVIEW OF SYSTEMS
[As Noted in HPI] : as noted in HPI [Breast Pain] : breast pain [Breast Lump] : breast lump [Negative] : Heme/Lymph [de-identified] : ecchymosis at biopsy site in the right breast; occasional and intermittent mild breast pain round the lump

## 2021-06-07 NOTE — DATA REVIEWED
[FreeTextEntry1] : -- 8/7/2020 ([facility]) b/l dx mammo/US showing right 12:00 1cmFN 19 x 9 x 17mm solid nodule recommended for US core biopsy, right 4:00 4cmFN corresponding to palpable abnormality measuring 3.4 x 1.9 x 2.8cm for which US guided biopsy is recommended, solidy mass right 7:00 4cmFN measuring 13 x 5 x 13mm recc for US guided core bopsy, solid mass right 8:00 5cMFN measuring 4 x 5 x 5mm recc for US guided biopsy, right 9:00 5cmFN solid mass measuring 2.3 x 1.0 x 2.7cm for which US guided biopsy recc. A morphologically abnormal lymph node in right axilla with nodular thickened cortex measuring 3mm, recc for US guided biopsy. Left 2:00 4cmFN solid nodule measuring 7 x 10 x 6mm recc for US guided biopsy, several benign appearing solid nodules in left breast for which follow up can be performed pending biopsy results. BIRADS 4C\par \par -- 8/19/2020 ([facility]) right 4:00 4cmFN US biopsy pathology invasive ductal carcinoma with apocrine features, poorly differentiated, 1.8cm in greatest dimension. IHC: ER + [>95% 3+] [place] + [>95% 3+] HER2 negative [0%]. Right 7:00 5cmFN US core biopsy pathology fibroadenoma, Right 8:00 5cmFN US core biopsy pathology benign breast tissue with stromal fibrosis, right axilla US core biopsy pathology fragments of benign lymph node \par  \par --9/2/2020 ([facility]) b/l breast MRI showing known 3cm biopsy proven carcinoma at 4:00 position of right breast demonstrating minimal enhancement and evidence of hemorrhage. The mass extends from the chest wall to subcutaneous dense tissues with loss of fat planes but no overt invasion. 2 lobulated masses with benign enhancement in upper quadrant right breast recommended for US guided biopsies. BIRADS 6 \par \par --9/15/2020 ([facility]) right 12:00 1cmFN US guided biopsy pathology ADH bordering on low grade DCIS arising in background of FEA and involving complex sclerosing lesion. Right 9:00 5cmFN US guided biopsy pathology fibroadenomatoid changes and sclerosing adenosis. Left breast 2:00 4cmFN US guided biopsy pathology fibroadenoma\par \par --9/17/2020 ([facility]) PET/CT Skull to thigh showing FDG avid right breast mass consistent with breast cancer. Two other FDG avid lesions are seen in the lateral right breast and were recently biopsied. No metastatic disease.\par \par 10/12/2020 ([facility]) right mastectomy pathology: invasive moderately differentiated ductal carcinoma, 2.4cm in size. Margins negative for invasive carcinoma, 2mm from anterior margin, 3mm from deep margin, ALH. Right breast 4:00 margin: benign fibroadipose tissue. Right breast axillary tail: benign breast tissue. Right breast axillary node: two lymph nodes negative for tumor. Right SLN: one lymph node negative for tumor. \par \par Baseline L-dx measurements obtained \par

## 2021-06-07 NOTE — REASON FOR VISIT
[Follow-Up: _____] : a [unfilled] follow-up visit [FreeTextEntry1] : right 4:00 4cmFN invasive ductal carcinoma moderately differentiated, 2.4cm in size, ER >95%, [place] >95% HER2 negative and right 12 o'clock ADH borderline DCIS. S/p right mastectomy with SLNB, 0/3 lymph nodes were positive for disease. s/p right mastectomy and right SLN. \par

## 2021-06-07 NOTE — HISTORY OF PRESENT ILLNESS
[FreeTextEntry1] : Jenn is a 40 year year old  female who presents for follow up right 4:00 4cmFN invasive ductal carcinoma moderately differentiated, 2.4cm in size, ER >95%, [place] >95% HER2 negative and right 12 o'clock ADH borderline DCIS. S/p right nipple sparing mastectomy, with no recon, with SLNB, 0/3 lymph nodes were positive for disease on 10/2/2020. She is doing well, reports improvement with ongoing physical therapy and with playing the violin. Denies fever and chills. \par \par Oncotype of 23. She started Tamoxifen on 2/22/2021 managed by Dr. Samson, but reports taking half a pill or one pill every two days secondary to headaches and feelings of swelling in her fingers. She underwent a PET/CT scan which identified the cancer localized to the breast with no evidence of metastatic disease.\par \par Patient currently does not wish to undergo reconstruction and inquired about a prosthetic bra.

## 2021-07-28 NOTE — REASON FOR VISIT
Writer notes that patient is currently admitted to [facility] for low blood pressures. Will monitor for discharge.    [Follow-Up: _____] : a [unfilled] follow-up visit [FreeTextEntry1] : right 4:00 4cmFN invasive ductal carcinoma moderately differentiated, 2.4cm in size, ER >95%, [place] >95% HER2 negative and right 12 o'clock ADH borderline DCIS. S/p right mastectomy with SLNB, 0/3 lymph nodes were positive for disease. s/p right mastectomy and right SLN. \par

## 2021-08-02 ENCOUNTER — RESULT REVIEW (OUTPATIENT)
Age: 41
End: 2021-08-02

## 2021-08-02 ENCOUNTER — APPOINTMENT (OUTPATIENT)
Dept: ULTRASOUND IMAGING | Facility: CLINIC | Age: 41
End: 2021-08-02
Payer: MEDICAID

## 2021-08-02 ENCOUNTER — OUTPATIENT (OUTPATIENT)
Dept: OUTPATIENT SERVICES | Facility: HOSPITAL | Age: 41
LOS: 1 days | End: 2021-08-02

## 2021-08-02 ENCOUNTER — APPOINTMENT (OUTPATIENT)
Dept: MAMMOGRAPHY | Facility: CLINIC | Age: 41
End: 2021-08-02
Payer: MEDICAID

## 2021-08-02 ENCOUNTER — APPOINTMENT (OUTPATIENT)
Dept: MRI IMAGING | Facility: CLINIC | Age: 41
End: 2021-08-02
Payer: MEDICAID

## 2021-08-02 PROCEDURE — 76641 ULTRASOUND BREAST COMPLETE: CPT | Mod: 26,LT

## 2021-08-02 PROCEDURE — G0279: CPT | Mod: 26

## 2021-08-02 PROCEDURE — 77049 MRI BREAST C-+ W/CAD BI: CPT | Mod: 26

## 2021-08-02 PROCEDURE — 77065 DX MAMMO INCL CAD UNI: CPT | Mod: 26,LT

## 2021-08-03 ENCOUNTER — NON-APPOINTMENT (OUTPATIENT)
Age: 41
End: 2021-08-03

## 2021-08-09 ENCOUNTER — APPOINTMENT (OUTPATIENT)
Dept: BREAST CENTER | Facility: CLINIC | Age: 41
End: 2021-08-09
Payer: MEDICAID

## 2021-08-09 VITALS — BODY MASS INDEX: 19.77 KG/M2 | OXYGEN SATURATION: 98 % | HEIGHT: 66 IN | WEIGHT: 123 LBS | HEART RATE: 91 BPM

## 2021-08-09 PROCEDURE — 99213 OFFICE O/P EST LOW 20 MIN: CPT

## 2021-08-09 PROCEDURE — 99072 ADDL SUPL MATRL&STAF TM PHE: CPT

## 2021-08-12 NOTE — DATA REVIEWED
[FreeTextEntry1] : -- 8/7/2020 ([facility]) b/l dx mammo/US showing right 12:00 1cmFN 19 x 9 x 17mm solid nodule recommended for US core biopsy, right 4:00 4cmFN corresponding to palpable abnormality measuring 3.4 x 1.9 x 2.8cm for which US guided biopsy is recommended, solidy mass right 7:00 4cmFN measuring 13 x 5 x 13mm recc for US guided core bopsy, solid mass right 8:00 5cMFN measuring 4 x 5 x 5mm recc for US guided biopsy, right 9:00 5cmFN solid mass measuring 2.3 x 1.0 x 2.7cm for which US guided biopsy recc. A morphologically abnormal lymph node in right axilla with nodular thickened cortex measuring 3mm, recc for US guided biopsy. Left 2:00 4cmFN solid nodule measuring 7 x 10 x 6mm recc for US guided biopsy, several benign appearing solid nodules in left breast for which follow up can be performed pending biopsy results. BIRADS 4C\par \par -- 8/19/2020 ([facility]) right 4:00 4cmFN US biopsy pathology invasive ductal carcinoma with apocrine features, poorly differentiated, 1.8cm in greatest dimension. IHC: ER + [>95% 3+] [place] + [>95% 3+] HER2 negative [0%]. Right 7:00 5cmFN US core biopsy pathology fibroadenoma, Right 8:00 5cmFN US core biopsy pathology benign breast tissue with stromal fibrosis, right axilla US core biopsy pathology fragments of benign lymph node \par  \par --9/2/2020 ([facility]) b/l breast MRI showing known 3cm biopsy proven carcinoma at 4:00 position of right breast demonstrating minimal enhancement and evidence of hemorrhage. The mass extends from the chest wall to subcutaneous dense tissues with loss of fat planes but no overt invasion. 2 lobulated masses with benign enhancement in upper quadrant right breast recommended for US guided biopsies. BIRADS 6 \par \par --9/15/2020 ([facility]) right 12:00 1cmFN US guided biopsy pathology ADH bordering on low grade DCIS arising in background of FEA and involving complex sclerosing lesion. Right 9:00 5cmFN US guided biopsy pathology fibroadenomatoid changes and sclerosing adenosis. Left breast 2:00 4cmFN US guided biopsy pathology fibroadenoma\par \par --9/17/2020 ([facility]) PET/CT Skull to thigh showing FDG avid right breast mass consistent with breast cancer. Two other FDG avid lesions are seen in the lateral right breast and were recently biopsied. No metastatic disease.\par \par 10/12/2020 ([facility]) right mastectomy pathology: invasive moderately differentiated ductal carcinoma, 2.4cm in size. Margins negative for invasive carcinoma, 2mm from anterior margin, 3mm from deep margin, ALH. Right breast 4:00 margin: benign fibroadipose tissue. Right breast axillary tail: benign breast tissue. Right breast axillary node: two lymph nodes negative for tumor. Right SLN: one lymph node negative for tumor. \par \par Baseline L-dx measurements obtained \par \par 8/2/2021 ([facility]) bilateral breast MRI showing no suspicious enhancement to warrant biopsy, post right mastectomy, Benign BIRADS 2 \par \par 8/2/2021 ([facility]) left mammogram/US showing breast is extremely dense, - 2:00 4 cm from the nipple measuring 6 x 4 x 4 mm, intervally decreased since 8/2020 and previously biopsied as benign; 4:00 1 cm from the nipple measuring 4 x 2 x 5 mm, intervally decreased since 8/2020, indicative of benign etiology;11:00 6 cm from the nipple measuring 4 x 3 x 4 mm, intervally decreased since 8/2020, indicative of benign etiology; 11:00 4 cm from the nipple measuring 5 x 2 x 5 mm, intervally decreased since 8/2020, indicative of benign etiology. No mammographic or sonographic evidence of malignancy. Benign BIRADS 2 \par \par

## 2021-08-12 NOTE — REVIEW OF SYSTEMS
[As Noted in HPI] : as noted in HPI [Breast Pain] : breast pain [Breast Lump] : breast lump [Negative] : Heme/Lymph [de-identified] : ecchymosis at biopsy site in the right breast; occasional and intermittent mild breast pain round the lump

## 2021-08-12 NOTE — REASON FOR VISIT
[Follow-Up: _____] : a [unfilled] follow-up visit [FreeTextEntry1] : right 4:00 4cmFN invasive ductal carcinoma moderately differentiated, 2.4cm in size, ER >95%, [place] >95% HER2 negative and right 12 o'clock ADH borderline DCIS. S/p right mastectomy with SLNB, 0/3 lymph nodes were positive for disease. s/p right mastectomy and right SLN. \par

## 2021-08-12 NOTE — HISTORY OF PRESENT ILLNESS
[FreeTextEntry1] : Jenn is a 40 year year old  female who presents for follow up right 4:00 4cmFN invasive ductal carcinoma moderately differentiated, 2.4cm in size, ER >95%, [place] >95% HER2 negative and right 12 o'clock ADH borderline DCIS. S/p right nipple sparing mastectomy, with no recon, with SLNB, 0/3 lymph nodes were positive for disease on 10/2/2020. She is doing well, reports improvement with ongoing physical therapy and with playing the violin. Patient has no breast complaints today. Denies nipple discharge, nipple/breast skin changes or dimpling. Denies fever and chills. \par \par Oncotype of 23. She started Tamoxifen on 2/22/2021 managed by Dr. Samson, states she is doing well on it.\par \par Patient's Sozo continues to increase slightly, patient is asymptomatic. Discussed that if she feels heaviness she can wear her sleeve.

## 2021-11-05 ENCOUNTER — NON-APPOINTMENT (OUTPATIENT)
Age: 41
End: 2021-11-05

## 2021-11-08 ENCOUNTER — APPOINTMENT (OUTPATIENT)
Dept: BREAST CENTER | Facility: CLINIC | Age: 41
End: 2021-11-08

## 2021-11-08 VITALS — HEIGHT: 66 IN | BODY MASS INDEX: 19.29 KG/M2 | HEART RATE: 100 BPM | OXYGEN SATURATION: 100 % | WEIGHT: 120 LBS

## 2021-11-08 NOTE — REVIEW OF SYSTEMS
[As Noted in HPI] : as noted in HPI [Breast Pain] : breast pain [Breast Lump] : breast lump [Negative] : Heme/Lymph [de-identified] : ecchymosis at biopsy site in the right breast; occasional and intermittent mild breast pain round the lump

## 2021-11-08 NOTE — DATA REVIEWED
[FreeTextEntry1] : -- 8/7/2020 ([facility]) b/l dx mammo/US showing right 12:00 1cmFN 19 x 9 x 17mm solid nodule recommended for US core biopsy, right 4:00 4cmFN corresponding to palpable abnormality measuring 3.4 x 1.9 x 2.8cm for which US guided biopsy is recommended, solidy mass right 7:00 4cmFN measuring 13 x 5 x 13mm recc for US guided core bopsy, solid mass right 8:00 5cMFN measuring 4 x 5 x 5mm recc for US guided biopsy, right 9:00 5cmFN solid mass measuring 2.3 x 1.0 x 2.7cm for which US guided biopsy recc. A morphologically abnormal lymph node in right axilla with nodular thickened cortex measuring 3mm, recc for US guided biopsy. Left 2:00 4cmFN solid nodule measuring 7 x 10 x 6mm recc for US guided biopsy, several benign appearing solid nodules in left breast for which follow up can be performed pending biopsy results. BIRADS 4C\par \par -- 8/19/2020 ([facility]) right 4:00 4cmFN US biopsy pathology invasive ductal carcinoma with apocrine features, poorly differentiated, 1.8cm in greatest dimension. IHC: ER + [>95% 3+] [place] + [>95% 3+] HER2 negative [0%]. Right 7:00 5cmFN US core biopsy pathology fibroadenoma, Right 8:00 5cmFN US core biopsy pathology benign breast tissue with stromal fibrosis, right axilla US core biopsy pathology fragments of benign lymph node \par  \par --9/2/2020 ([facility]) b/l breast MRI showing known 3cm biopsy proven carcinoma at 4:00 position of right breast demonstrating minimal enhancement and evidence of hemorrhage. The mass extends from the chest wall to subcutaneous dense tissues with loss of fat planes but no overt invasion. 2 lobulated masses with benign enhancement in upper quadrant right breast recommended for US guided biopsies. BIRADS 6 \par \par --9/15/2020 ([facility]) right 12:00 1cmFN US guided biopsy pathology ADH bordering on low grade DCIS arising in background of FEA and involving complex sclerosing lesion. Right 9:00 5cmFN US guided biopsy pathology fibroadenomatoid changes and sclerosing adenosis. Left breast 2:00 4cmFN US guided biopsy pathology fibroadenoma\par \par --9/17/2020 ([facility]) PET/CT Skull to thigh showing FDG avid right breast mass consistent with breast cancer. Two other FDG avid lesions are seen in the lateral right breast and were recently biopsied. No metastatic disease.\par \par 10/12/2020 ([facility]) right mastectomy pathology: invasive moderately differentiated ductal carcinoma, 2.4cm in size. Margins negative for invasive carcinoma, 2mm from anterior margin, 3mm from deep margin, ALH. Right breast 4:00 margin: benign fibroadipose tissue. Right breast axillary tail: benign breast tissue. Right breast axillary node: two lymph nodes negative for tumor. Right SLN: one lymph node negative for tumor. \par \par Baseline L-dx measurements obtained \par \par 8/2/2021 ([facility]) bilateral breast MRI showing no suspicious enhancement to warrant biopsy, post right mastectomy, Benign BIRADS 2 \par \par 8/2/2021 ([facility]) left mammogram/US showing breast is extremely dense, - 2:00 4 cm from the nipple measuring 6 x 4 x 4 mm, intervally decreased since 8/2020 and previously biopsied as benign; 4:00 1 cm from the nipple measuring 4 x 2 x 5 mm, intervally decreased since 8/2020, indicative of benign etiology;11:00 6 cm from the nipple measuring 4 x 3 x 4 mm, intervally decreased since 8/2020, indicative of benign etiology; 11:00 4 cm from the nipple measuring 5 x 2 x 5 mm, intervally decreased since 8/2020, indicative of benign etiology. No mammographic or sonographic evidence of malignancy. Benign BIRADS 2 \par \par

## 2021-11-08 NOTE — REASON FOR VISIT
[Follow-Up: _____] : a [unfilled] follow-up visit [FreeTextEntry1] : right 4:00 4cmFN invasive ductal carcinoma moderately differentiated, 2.4cm in size, ER >95%, [place] >95% HER2 negative and right 12 o'clock ADH borderline DCIS. S/p right mastectomy with SLNB, 0/3 lymph nodes were positive for disease. s/p right mastectomy and right SLN. \par

## 2021-11-08 NOTE — HISTORY OF PRESENT ILLNESS
[FreeTextEntry1] : Jenn is a 40 year year old  female who presents for follow up right 4:00 4cmFN invasive ductal carcinoma moderately differentiated, 2.4cm in size, ER >95%, [place] >95% HER2 negative and right 12 o'clock ADH borderline DCIS. S/p right nipple sparing mastectomy, with no recon, with SLNB, 0/3 lymph nodes were positive for disease on 10/2/2020. She is doing well, reports improvement with ongoing physical therapy and with playing the violin. Patient has no breast complaints today. Denies nipple discharge, nipple/breast skin changes or dimpling. Denies fever and chills. \par \par Oncotype of 23. She started Tamoxifen on 2/22/2021 managed by Dr. Samson, states she is doing well on it.\par \par Patient's Sozo continues to increase slightly, patient is asymptomatic. Discussed that if she feels heaviness she can wear her sleeve.

## 2022-02-07 ENCOUNTER — APPOINTMENT (OUTPATIENT)
Dept: BREAST CENTER | Facility: CLINIC | Age: 42
End: 2022-02-07

## 2022-02-14 ENCOUNTER — APPOINTMENT (OUTPATIENT)
Dept: BREAST CENTER | Facility: CLINIC | Age: 42
End: 2022-02-14
Payer: MEDICAID

## 2022-02-14 ENCOUNTER — RESULT REVIEW (OUTPATIENT)
Age: 42
End: 2022-02-14

## 2022-02-14 VITALS — OXYGEN SATURATION: 98 % | WEIGHT: 123 LBS | HEIGHT: 66 IN | BODY MASS INDEX: 19.77 KG/M2 | HEART RATE: 102 BPM

## 2022-02-14 PROCEDURE — 99072 ADDL SUPL MATRL&STAF TM PHE: CPT

## 2022-02-14 PROCEDURE — 99213 OFFICE O/P EST LOW 20 MIN: CPT

## 2022-02-14 PROCEDURE — 93702 BIS XTRACELL FLUID ANALYSIS: CPT

## 2022-02-15 NOTE — REVIEW OF SYSTEMS
[As Noted in HPI] : as noted in HPI [Breast Pain] : breast pain [Breast Lump] : breast lump [Negative] : Heme/Lymph [de-identified] : ecchymosis at biopsy site in the right breast; occasional and intermittent mild breast pain round the lump

## 2022-02-15 NOTE — DATA REVIEWED
[FreeTextEntry1] : -- 8/7/2020 ([facility]) b/l dx mammo/US showing right 12:00 1cmFN 19 x 9 x 17mm solid nodule recommended for US core biopsy, right 4:00 4cmFN corresponding to palpable abnormality measuring 3.4 x 1.9 x 2.8cm for which US guided biopsy is recommended, solidy mass right 7:00 4cmFN measuring 13 x 5 x 13mm recc for US guided core bopsy, solid mass right 8:00 5cMFN measuring 4 x 5 x 5mm recc for US guided biopsy, right 9:00 5cmFN solid mass measuring 2.3 x 1.0 x 2.7cm for which US guided biopsy recc. A morphologically abnormal lymph node in right axilla with nodular thickened cortex measuring 3mm, recc for US guided biopsy. Left 2:00 4cmFN solid nodule measuring 7 x 10 x 6mm recc for US guided biopsy, several benign appearing solid nodules in left breast for which follow up can be performed pending biopsy results. BIRADS 4C\par \par -- 8/19/2020 ([facility]) right 4:00 4cmFN US biopsy pathology invasive ductal carcinoma with apocrine features, poorly differentiated, 1.8cm in greatest dimension. IHC: ER + [>95% 3+] [place] + [>95% 3+] HER2 negative [0%]. Right 7:00 5cmFN US core biopsy pathology fibroadenoma, Right 8:00 5cmFN US core biopsy pathology benign breast tissue with stromal fibrosis, right axilla US core biopsy pathology fragments of benign lymph node \par  \par --9/2/2020 ([facility]) b/l breast MRI showing known 3cm biopsy proven carcinoma at 4:00 position of right breast demonstrating minimal enhancement and evidence of hemorrhage. The mass extends from the chest wall to subcutaneous dense tissues with loss of fat planes but no overt invasion. 2 lobulated masses with benign enhancement in upper quadrant right breast recommended for US guided biopsies. BIRADS 6 \par \par --9/15/2020 ([facility]) right 12:00 1cmFN US guided biopsy pathology ADH bordering on low grade DCIS arising in background of FEA and involving complex sclerosing lesion. Right 9:00 5cmFN US guided biopsy pathology fibroadenomatoid changes and sclerosing adenosis. Left breast 2:00 4cmFN US guided biopsy pathology fibroadenoma\par \par --9/17/2020 ([facility]) PET/CT Skull to thigh showing FDG avid right breast mass consistent with breast cancer. Two other FDG avid lesions are seen in the lateral right breast and were recently biopsied. No metastatic disease.\par \par 10/12/2020 ([facility]) right mastectomy pathology: invasive moderately differentiated ductal carcinoma, 2.4cm in size. Margins negative for invasive carcinoma, 2mm from anterior margin, 3mm from deep margin, ALH. Right breast 4:00 margin: benign fibroadipose tissue. Right breast axillary tail: benign breast tissue. Right breast axillary node: two lymph nodes negative for tumor. Right SLN: one lymph node negative for tumor. \par \par Baseline L-dx measurements obtained \par \par 8/2/2021 ([facility]) bilateral breast MRI showing no suspicious enhancement to warrant biopsy, post right mastectomy, Benign BIRADS 2 \par \par 8/2/2021 ([facility]) left mammogram/US showing breast is extremely dense, - 2:00 4 cm from the nipple measuring 6 x 4 x 4 mm, intervally decreased since 8/2020 and previously biopsied as benign; 4:00 1 cm from the nipple measuring 4 x 2 x 5 mm, intervally decreased since 8/2020, indicative of benign etiology;11:00 6 cm from the nipple measuring 4 x 3 x 4 mm, intervally decreased since 8/2020, indicative of benign etiology; 11:00 4 cm from the nipple measuring 5 x 2 x 5 mm, intervally decreased since 8/2020, indicative of benign etiology. No mammographic or sonographic evidence of malignancy. Benign BIRADS 2

## 2022-02-15 NOTE — HISTORY OF PRESENT ILLNESS
[FreeTextEntry1] : Jenn is a 41 year old  female who presents for follow up right 4:00 4cmFN invasive ductal carcinoma moderately differentiated, 2.4cm in size, ER >95%, [place] >95% HER2 negative and right 12 o'clock ADH borderline DCIS. S/p right nipple sparing mastectomy, with no recon, with SLNB, 0/3 lymph nodes were positive for disease on 10/2/2020.  Denies nipple discharge, nipple/breast skin changes or dimpling. Denies fever and chills. \par \par Oncotype of 23. She started Tamoxifen on 2/22/2021 managed by Dr. Samson, states she is doing well on it.\par \par Patient's Sozo today has decreased from prior; will continue with another measurement in 3 months.

## 2022-02-15 NOTE — REASON FOR VISIT
[Follow-Up: _____] : a [unfilled] follow-up visit [FreeTextEntry1] : right 4:00 4cmFN invasive ductal carcinoma moderately differentiated, 2.4cm in size, ER >95%, [place] >95% HER2 negative and right 12 o'clock ADH borderline DCIS. S/p right mastectomy with SLNB, 0/3 lymph nodes were positive for disease. s/p right mastectomy and right SLN. \par

## 2022-05-16 ENCOUNTER — APPOINTMENT (OUTPATIENT)
Dept: BREAST CENTER | Facility: CLINIC | Age: 42
End: 2022-05-16

## 2022-08-01 ENCOUNTER — APPOINTMENT (OUTPATIENT)
Dept: ULTRASOUND IMAGING | Facility: CLINIC | Age: 42
End: 2022-08-01

## 2022-08-01 ENCOUNTER — OUTPATIENT (OUTPATIENT)
Dept: OUTPATIENT SERVICES | Facility: HOSPITAL | Age: 42
LOS: 1 days | End: 2022-08-01

## 2022-08-01 ENCOUNTER — RESULT REVIEW (OUTPATIENT)
Age: 42
End: 2022-08-01

## 2022-08-01 ENCOUNTER — APPOINTMENT (OUTPATIENT)
Dept: MAMMOGRAPHY | Facility: CLINIC | Age: 42
End: 2022-08-01

## 2022-08-01 PROCEDURE — 76641 ULTRASOUND BREAST COMPLETE: CPT | Mod: 26,LT

## 2022-08-01 PROCEDURE — G0279: CPT | Mod: 26

## 2022-08-01 PROCEDURE — 77065 DX MAMMO INCL CAD UNI: CPT | Mod: 26,LT

## 2022-08-16 ENCOUNTER — NON-APPOINTMENT (OUTPATIENT)
Age: 42
End: 2022-08-16

## 2022-08-17 ENCOUNTER — NON-APPOINTMENT (OUTPATIENT)
Age: 42
End: 2022-08-17

## 2022-08-17 ENCOUNTER — APPOINTMENT (OUTPATIENT)
Dept: CT IMAGING | Facility: CLINIC | Age: 42
End: 2022-08-17

## 2022-08-17 ENCOUNTER — OUTPATIENT (OUTPATIENT)
Dept: OUTPATIENT SERVICES | Facility: HOSPITAL | Age: 42
LOS: 1 days | End: 2022-08-17

## 2022-08-17 PROCEDURE — 74177 CT ABD & PELVIS W/CONTRAST: CPT | Mod: 26

## 2022-08-22 ENCOUNTER — APPOINTMENT (OUTPATIENT)
Dept: BREAST CENTER | Facility: CLINIC | Age: 42
End: 2022-08-22

## 2022-08-22 VITALS — BODY MASS INDEX: 20.41 KG/M2 | HEART RATE: 69 BPM | WEIGHT: 127 LBS | OXYGEN SATURATION: 97 % | HEIGHT: 66 IN

## 2022-08-22 PROCEDURE — 99214 OFFICE O/P EST MOD 30 MIN: CPT

## 2022-08-22 PROCEDURE — 93702 BIS XTRACELL FLUID ANALYSIS: CPT | Mod: NC

## 2022-08-22 NOTE — HISTORY OF PRESENT ILLNESS
[FreeTextEntry1] : Jenn is a 41 year old  female who presents for follow up right 4:00 4cmFN invasive ductal carcinoma moderately differentiated, 2.4cm in size, ER >95%, [place] >95% HER2 negative and right 12 o'clock ADH borderline DCIS. S/p right nipple sparing mastectomy, with no recon, with SLNB, 0/3 lymph nodes were positive for disease on 10/2/2020. Oncotype of 23. She started Tamoxifen on 2/22/2021 managed by Dr. Samson, states she is doing well on it. Denies nipple discharge, nipple/breast skin changes or dimpling. \par \par Patient states she has been feeling "bloated" for the last 4 months, states the bloating sensation feels similar to when she is on her menses; however, it persists. She underwent a CT scan ordered Dr. Samson and states it showed the presence of uterine fibroids. Patient was recommended by Dr. Samson to establish care with gynecology, she has not done so yet, states she has never been to a gynecologist. Emphasized the importance to establish care for follow up regarding CT findings as well as for cervical cancer screenings. \par \par Sozo measurement obtained today, was within normal limits. \par \par Patient was informed that I am leaving [facility] and will be practicing at an outside institution.The patient understands that there should not be a gap in care and was offered to establish care under a breast provider within the practice. The patient verbalized understanding of the above; however, she states she does not wish to be followed in her survivorship with a different provider and states she will be following up with Dr. Samson. \par

## 2022-08-22 NOTE — REVIEW OF SYSTEMS
[As Noted in HPI] : as noted in HPI [Breast Pain] : breast pain [Breast Lump] : breast lump [Negative] : Heme/Lymph [de-identified] : ecchymosis at biopsy site in the right breast; occasional and intermittent mild breast pain round the lump

## 2022-08-22 NOTE — DATA REVIEWED
[FreeTextEntry1] : -- 8/7/2020 ([facility]) b/l dx mammo/US showing right 12:00 1cmFN 19 x 9 x 17mm solid nodule recommended for US core biopsy, right 4:00 4cmFN corresponding to palpable abnormality measuring 3.4 x 1.9 x 2.8cm for which US guided biopsy is recommended, solidy mass right 7:00 4cmFN measuring 13 x 5 x 13mm recc for US guided core bopsy, solid mass right 8:00 5cMFN measuring 4 x 5 x 5mm recc for US guided biopsy, right 9:00 5cmFN solid mass measuring 2.3 x 1.0 x 2.7cm for which US guided biopsy recc. A morphologically abnormal lymph node in right axilla with nodular thickened cortex measuring 3mm, recc for US guided biopsy. Left 2:00 4cmFN solid nodule measuring 7 x 10 x 6mm recc for US guided biopsy, several benign appearing solid nodules in left breast for which follow up can be performed pending biopsy results. BIRADS 4C\par \par -- 8/19/2020 ([facility]) right 4:00 4cmFN US biopsy pathology invasive ductal carcinoma with apocrine features, poorly differentiated, 1.8cm in greatest dimension. IHC: ER + [>95% 3+] VT + [>95% 3+] HER2 negative [0%]. Right 7:00 5cmFN US core biopsy pathology fibroadenoma, Right 8:00 5cmFN US core biopsy pathology benign breast tissue with stromal fibrosis, right axilla US core biopsy pathology fragments of benign lymph node \par  \par --9/2/2020 ([facility]) b/l breast MRI showing known 3cm biopsy proven carcinoma at 4:00 position of right breast demonstrating minimal enhancement and evidence of hemorrhage. The mass extends from the chest wall to subcutaneous dense tissues with loss of fat planes but no overt invasion. 2 lobulated masses with benign enhancement in upper quadrant right breast recommended for US guided biopsies. BIRADS 6 \par \par --9/15/2020 ([facility]) right 12:00 1cmFN US guided biopsy pathology ADH bordering on low grade DCIS arising in background of FEA and involving complex sclerosing lesion. Right 9:00 5cmFN US guided biopsy pathology fibroadenomatoid changes and sclerosing adenosis. Left breast 2:00 4cmFN US guided biopsy pathology fibroadenoma\par \par --9/17/2020 ([facility]) PET/CT Skull to thigh showing FDG avid right breast mass consistent with breast cancer. Two other FDG avid lesions are seen in the lateral right breast and were recently biopsied. No metastatic disease.\par \par 10/12/2020 ([facility]) right mastectomy pathology: invasive moderately differentiated ductal carcinoma, 2.4cm in size. Margins negative for invasive carcinoma, 2mm from anterior margin, 3mm from deep margin, ALH. Right breast 4:00 margin: benign fibroadipose tissue. Right breast axillary tail: benign breast tissue. Right breast axillary node: two lymph nodes negative for tumor. Right SLN: one lymph node negative for tumor. \par \par Baseline L-dx measurements obtained \par \par 8/2/2021 ([facility]) bilateral breast MRI showing no suspicious enhancement to warrant biopsy, post right mastectomy, Benign BIRADS 2 \par \par 8/2/2021 ([facility]) left mammogram/US showing breast is extremely dense, - 2:00 4 cm from the nipple measuring 6 x 4 x 4 mm, intervally decreased since 8/2020 and previously biopsied as benign; 4:00 1 cm from the nipple measuring 4 x 2 x 5 mm, intervally decreased since 8/2020, indicative of benign etiology;11:00 6 cm from the nipple measuring 4 x 3 x 4 mm, intervally decreased since 8/2020, indicative of benign etiology; 11:00 4 cm from the nipple measuring 5 x 2 x 5 mm, intervally decreased since 8/2020, indicative of benign etiology. No mammographic or sonographic evidence of malignancy. Benign BIRADS 2 \par \par 8/1/2022 ([facility]) left mammogram/US showing breasts are extremely dense, No mammographic/sonographic evidence of malignancy. Mammography and ultrasound in 1 year. BI-RADS 2 - Benign Finding(s)

## 2022-08-22 NOTE — PAST MEDICAL HISTORY
[Menstruating] : The patient is menstruating [Menarche Age ____] : age at menarche was [unfilled] [Total Preg ___] : G[unfilled] [Approximately ___] : the LMP was approximately [unfilled] [Irregular Cycle Intervals] : are  irregular [History of Hormone Replacement Treatment] : has no history of hormone replacement treatment [FreeTextEntry6] : none [FreeTextEntry7] : none

## 2022-08-22 NOTE — REASON FOR VISIT
[Follow-Up: _____] : a [unfilled] follow-up visit [FreeTextEntry1] : right 4:00 4cmFN invasive ductal carcinoma moderately differentiated, 2.4cm in size, ER >95%, ID >95% HER2 negative and right 12 o'clock ADH borderline DCIS. S/p right mastectomy with SLNB, 0/3 lymph nodes were positive for disease. s/p right mastectomy and right SLN. \par

## 2022-09-08 ENCOUNTER — TRANSCRIPTION ENCOUNTER (OUTPATIENT)
Age: 42
End: 2022-09-08

## 2022-10-24 ENCOUNTER — TRANSCRIPTION ENCOUNTER (OUTPATIENT)
Age: 42
End: 2022-10-24

## 2022-10-27 ENCOUNTER — TRANSCRIPTION ENCOUNTER (OUTPATIENT)
Age: 42
End: 2022-10-27

## 2022-10-28 ENCOUNTER — TRANSCRIPTION ENCOUNTER (OUTPATIENT)
Age: 42
End: 2022-10-28

## 2022-11-03 ENCOUNTER — TRANSCRIPTION ENCOUNTER (OUTPATIENT)
Age: 42
End: 2022-11-03

## 2022-12-05 ENCOUNTER — NON-APPOINTMENT (OUTPATIENT)
Age: 42
End: 2022-12-05

## 2022-12-15 ENCOUNTER — APPOINTMENT (OUTPATIENT)
Dept: INTERNAL MEDICINE | Facility: CLINIC | Age: 42
End: 2022-12-15

## 2022-12-15 ENCOUNTER — APPOINTMENT (OUTPATIENT)
Dept: OTOLARYNGOLOGY | Facility: CLINIC | Age: 42
End: 2022-12-15

## 2022-12-15 ENCOUNTER — NON-APPOINTMENT (OUTPATIENT)
Age: 42
End: 2022-12-15

## 2022-12-15 VITALS
DIASTOLIC BLOOD PRESSURE: 90 MMHG | HEART RATE: 103 BPM | OXYGEN SATURATION: 95 % | TEMPERATURE: 98 F | BODY MASS INDEX: 20.09 KG/M2 | HEIGHT: 66 IN | SYSTOLIC BLOOD PRESSURE: 132 MMHG | WEIGHT: 125 LBS

## 2022-12-15 VITALS
DIASTOLIC BLOOD PRESSURE: 74 MMHG | HEART RATE: 83 BPM | BODY MASS INDEX: 20.09 KG/M2 | WEIGHT: 125 LBS | TEMPERATURE: 97.7 F | OXYGEN SATURATION: 98 % | HEIGHT: 66 IN | SYSTOLIC BLOOD PRESSURE: 109 MMHG

## 2022-12-15 PROCEDURE — 99213 OFFICE O/P EST LOW 20 MIN: CPT

## 2022-12-15 PROCEDURE — 69210 REMOVE IMPACTED EAR WAX UNI: CPT

## 2022-12-15 PROCEDURE — 99202 OFFICE O/P NEW SF 15 MIN: CPT | Mod: 25

## 2022-12-15 NOTE — REVIEW OF SYSTEMS
[Earache] : earache [Hearing Loss] : hearing loss [Negative] : Heme/Lymph [Nasal Discharge] : no nasal discharge [Sore Throat] : no sore throat [Postnasal Drip] : no postnasal drip

## 2022-12-15 NOTE — HISTORY OF PRESENT ILLNESS
[de-identified] : Blocked hearing on her L & an impaction was seen; when she moves her auricle she can hear normally. Does not use qtips. Minimal L sided tinnitus only the last few days.

## 2022-12-15 NOTE — HISTORY OF PRESENT ILLNESS
[FreeTextEntry8] : 1. ear pain\par 2. completed COVID vaccine x 4 including Bivalent \par \par c/o pain left ear x 4 days\par on/off  \par feels clogged \par left decreased hearing \par no fever, drainage\par no trauma\par no Qtips\par no scuba diving/flying \par no anosmia, fever, cough, CP, pl CP, nasal congestion, sore throat, nvd, myalgias, calf pain/swelling \par h/o ceruminosis - 2015 tx by ENT\par completed COVID vaccine including Bivalent\par completed Flu shot

## 2022-12-15 NOTE — PHYSICAL EXAM
[No Acute Distress] : no acute distress [Normal Sclera/Conjunctiva] : normal sclera/conjunctiva [Normal Oropharynx] : the oropharynx was normal [No Lymphadenopathy] : no lymphadenopathy [Clear to Auscultation] : lungs were clear to auscultation bilaterally [No Rash] : no rash [Normal] : affect was normal and insight and judgment were intact [de-identified] : ceruminosis left ear

## 2022-12-15 NOTE — ASSESSMENT
[FreeTextEntry1] : All ear complaints resolved after cleaning; RTC for an ear cleaning in 6 months, sooner prn any ongoing sxs or changes.\par

## 2022-12-15 NOTE — PHYSICAL EXAM
[Binocular Microscopic Exam] : Binocular microscopic exam was performed [FreeTextEntry9] : deep cerumen impaction removed with suction after pretreatment w/ H2O2 [Normal] : assessment of respiratory effort is normal

## 2022-12-15 NOTE — CONSULT LETTER
[Dear  ___] : Dear  [unfilled], [Consult Letter:] : I had the pleasure of evaluating your patient, [unfilled]. [Please see my note below.] : Please see my note below. [Consult Closing:] : Thank you very much for allowing me to participate in the care of this patient.  If you have any questions, please do not hesitate to contact me. [Sincerely,] : Sincerely, [FreeTextEntry3] : CUCA Arboleda Jr, MD, FAAOHNS\par Otolaryngologist\par [facility]

## 2023-01-10 DIAGNOSIS — H61.22 IMPACTED CERUMEN, LEFT EAR: ICD-10-CM

## 2023-02-07 ENCOUNTER — OUTPATIENT (OUTPATIENT)
Dept: OUTPATIENT SERVICES | Facility: HOSPITAL | Age: 43
LOS: 1 days | End: 2023-02-07

## 2023-02-07 ENCOUNTER — APPOINTMENT (OUTPATIENT)
Dept: MRI IMAGING | Facility: CLINIC | Age: 43
End: 2023-02-07

## 2023-02-09 ENCOUNTER — TRANSCRIPTION ENCOUNTER (OUTPATIENT)
Age: 43
End: 2023-02-09

## 2023-02-10 ENCOUNTER — TRANSCRIPTION ENCOUNTER (OUTPATIENT)
Age: 43
End: 2023-02-10

## 2023-03-03 DIAGNOSIS — C50.911 MALIGNANT NEOPLASM OF UNSPECIFIED SITE OF RIGHT FEMALE BREAST: ICD-10-CM

## 2023-03-07 ENCOUNTER — NON-APPOINTMENT (OUTPATIENT)
Age: 43
End: 2023-03-07

## 2023-03-07 ENCOUNTER — APPOINTMENT (OUTPATIENT)
Dept: INTERNAL MEDICINE | Facility: CLINIC | Age: 43
End: 2023-03-07

## 2023-03-08 ENCOUNTER — NON-APPOINTMENT (OUTPATIENT)
Age: 43
End: 2023-03-08

## 2023-03-09 ENCOUNTER — APPOINTMENT (OUTPATIENT)
Dept: BREAST CENTER | Facility: CLINIC | Age: 43
End: 2023-03-09
Payer: MEDICAID

## 2023-03-09 VITALS
HEIGHT: 66 IN | OXYGEN SATURATION: 97 % | SYSTOLIC BLOOD PRESSURE: 149 MMHG | RESPIRATION RATE: 16 BRPM | BODY MASS INDEX: 20.09 KG/M2 | WEIGHT: 125 LBS | HEART RATE: 110 BPM | DIASTOLIC BLOOD PRESSURE: 93 MMHG

## 2023-03-09 PROCEDURE — 99213 OFFICE O/P EST LOW 20 MIN: CPT

## 2023-03-09 NOTE — HISTORY OF PRESENT ILLNESS
[FreeTextEntry1] : Jenn is a 42 year old  female who presents for breast cancer surveillance. Patient has a history of right 4:00 4cmFN invasive ductal carcinoma moderately differentiated, 2.4cm in size, ER >95%, OR >95% HER2 negative and right 12 o'clock ADH borderline DCIS. S/p right nipple sparing mastectomy, with no recon, with SLNB, (0/3 LN negative) on 10/2/2020. Oncotype of 23. She started Tamoxifen on 2/22/2021 managed by Dr. Samson, states she is doing well on it. Patient underwent panel genetic testing in 9/2020 with negative results. Denies nipple discharge, nipple/breast skin changes or dimpling. \par \par Of note, patient reports abdominal bloating previously complained of has resolved. she underwent a CT scan ordered Dr. Samson and reported it showed the presence of uterine fibroids. Patient was recommended by Dr. Samson to establish care with gynecology, she has not done so yet, states she has never been to a gynecologist. Re-emphasized the importance to establish care for follow up regarding CT findings as well as for cervical cancer screenings. Patient verbalized understanding. \par \par \par \par

## 2023-03-09 NOTE — PAST MEDICAL HISTORY
[Menstruating] : The patient is menstruating [Menarche Age ____] : age at menarche was [unfilled] [Approximately ___] : the LMP was approximately [unfilled] [Irregular Cycle Intervals] : are  irregular [Total Preg ___] : G[unfilled] [Definite ___ (Date)] : the last menstrual period was [unfilled] [History of Hormone Replacement Treatment] : has no history of hormone replacement treatment [FreeTextEntry6] : none [FreeTextEntry7] : none

## 2023-03-09 NOTE — REVIEW OF SYSTEMS
[As Noted in HPI] : as noted in HPI [Breast Pain] : breast pain [Breast Lump] : breast lump [Negative] : Heme/Lymph [de-identified] : ecchymosis at biopsy site in the right breast; occasional and intermittent mild breast pain round the lump

## 2023-03-09 NOTE — PHYSICAL EXAM
[Examined in the supine and seated position] : examined in the supine and seated position [No dominant masses] : no dominant masses in right breast  [No dominant masses] : no dominant masses left breast [No Nipple Retraction] : no left nipple retraction [No Nipple Discharge] : no left nipple discharge [No Axillary Lymphadenopathy] : no left axillary lymphadenopathy [de-identified] : well healed mastectomy incision, appears to be a keloid.

## 2023-03-09 NOTE — REASON FOR VISIT
[Follow-Up: _____] : a [unfilled] follow-up visit [FreeTextEntry1] : right 4:00 4cmFN invasive ductal carcinoma moderately differentiated, 2.4cm in size, ER >95%, MS >95% HER2 negative and right 12 o'clock ADH borderline DCIS. S/p right mastectomy with SLNB, 0/3 lymph nodes were positive for disease. s/p right mastectomy and right SLN. \par

## 2023-03-09 NOTE — DATA REVIEWED
[FreeTextEntry1] : -- 8/7/2020 ([facility]) b/l dx mammo/US showing right 12:00 1cmFN 19 x 9 x 17mm solid nodule recommended for US core biopsy, right 4:00 4cmFN corresponding to palpable abnormality measuring 3.4 x 1.9 x 2.8cm for which US guided biopsy is recommended, solidy mass right 7:00 4cmFN measuring 13 x 5 x 13mm recc for US guided core bopsy, solid mass right 8:00 5cMFN measuring 4 x 5 x 5mm recc for US guided biopsy, right 9:00 5cmFN solid mass measuring 2.3 x 1.0 x 2.7cm for which US guided biopsy recc. A morphologically abnormal lymph node in right axilla with nodular thickened cortex measuring 3mm, recc for US guided biopsy. Left 2:00 4cmFN solid nodule measuring 7 x 10 x 6mm recc for US guided biopsy, several benign appearing solid nodules in left breast for which follow up can be performed pending biopsy results. BIRADS 4C\par \par -- 8/19/2020 ([facility]) right 4:00 4cmFN US biopsy pathology invasive ductal carcinoma with apocrine features, poorly differentiated, 1.8cm in greatest dimension. IHC: ER + [>95% 3+] FL + [>95% 3+] HER2 negative [0%]. Right 7:00 5cmFN US core biopsy pathology fibroadenoma, Right 8:00 5cmFN US core biopsy pathology benign breast tissue with stromal fibrosis, right axilla US core biopsy pathology fragments of benign lymph node \par  \par --9/2/2020 ([facility]) b/l breast MRI showing known 3cm biopsy proven carcinoma at 4:00 position of right breast demonstrating minimal enhancement and evidence of hemorrhage. The mass extends from the chest wall to subcutaneous dense tissues with loss of fat planes but no overt invasion. 2 lobulated masses with benign enhancement in upper quadrant right breast recommended for US guided biopsies. BIRADS 6 \par \par --9/15/2020 ([facility]) right 12:00 1cmFN US guided biopsy pathology ADH bordering on low grade DCIS arising in background of FEA and involving complex sclerosing lesion. Right 9:00 5cmFN US guided biopsy pathology fibroadenomatoid changes and sclerosing adenosis. Left breast 2:00 4cmFN US guided biopsy pathology fibroadenoma\par \par --9/17/2020 ([facility]) PET/CT Skull to thigh showing FDG avid right breast mass consistent with breast cancer. Two other FDG avid lesions are seen in the lateral right breast and were recently biopsied. No metastatic disease.\par \par 10/12/2020 ([facility]) right mastectomy pathology: invasive moderately differentiated ductal carcinoma, 2.4cm in size. Margins negative for invasive carcinoma, 2mm from anterior margin, 3mm from deep margin, ALH. Right breast 4:00 margin: benign fibroadipose tissue. Right breast axillary tail: benign breast tissue. Right breast axillary node: two lymph nodes negative for tumor. Right SLN: one lymph node negative for tumor. \par \par Baseline L-dx measurements obtained \par \par 8/2/2021 ([facility]) bilateral breast MRI showing no suspicious enhancement to warrant biopsy, post right mastectomy, Benign BIRADS 2 \par \par 8/2/2021 ([facility]) left mammogram/US showing breast is extremely dense, - 2:00 4 cm from the nipple measuring 6 x 4 x 4 mm, intervally decreased since 8/2020 and previously biopsied as benign; 4:00 1 cm from the nipple measuring 4 x 2 x 5 mm, intervally decreased since 8/2020, indicative of benign etiology;11:00 6 cm from the nipple measuring 4 x 3 x 4 mm, intervally decreased since 8/2020, indicative of benign etiology; 11:00 4 cm from the nipple measuring 5 x 2 x 5 mm, intervally decreased since 8/2020, indicative of benign etiology. No mammographic or sonographic evidence of malignancy. Benign BIRADS 2 \par \par 8/1/2022 ([facility]) left mammogram/US showing breasts are extremely dense, No mammographic/sonographic evidence of malignancy. Mammography and ultrasound in 1 year. BI-RADS 2 - Benign Finding(s) \par \par 2/7/23 ([facility]) MRI: no suspicious enhancement to warrant biopsy. No axillary adenopathy. Benign BIRADS 2

## 2023-05-30 ENCOUNTER — TRANSCRIPTION ENCOUNTER (OUTPATIENT)
Age: 43
End: 2023-05-30

## 2023-05-31 ENCOUNTER — APPOINTMENT (OUTPATIENT)
Dept: INTERNAL MEDICINE | Facility: CLINIC | Age: 43
End: 2023-05-31

## 2023-06-15 ENCOUNTER — APPOINTMENT (OUTPATIENT)
Dept: OTOLARYNGOLOGY | Facility: CLINIC | Age: 43
End: 2023-06-15

## 2023-08-15 ENCOUNTER — TRANSCRIPTION ENCOUNTER (OUTPATIENT)
Age: 43
End: 2023-08-15

## 2023-08-17 ENCOUNTER — APPOINTMENT (OUTPATIENT)
Dept: BREAST CENTER | Facility: CLINIC | Age: 43
End: 2023-08-17

## 2023-11-02 ENCOUNTER — APPOINTMENT (OUTPATIENT)
Dept: ULTRASOUND IMAGING | Facility: CLINIC | Age: 43
End: 2023-11-02

## 2023-11-02 ENCOUNTER — RESULT REVIEW (OUTPATIENT)
Age: 43
End: 2023-11-02

## 2023-11-02 ENCOUNTER — APPOINTMENT (OUTPATIENT)
Dept: BREAST CENTER | Facility: CLINIC | Age: 43
End: 2023-11-02
Payer: MEDICAID

## 2023-11-02 ENCOUNTER — OUTPATIENT (OUTPATIENT)
Dept: OUTPATIENT SERVICES | Facility: HOSPITAL | Age: 43
LOS: 1 days | End: 2023-11-02

## 2023-11-02 ENCOUNTER — APPOINTMENT (OUTPATIENT)
Dept: MAMMOGRAPHY | Facility: CLINIC | Age: 43
End: 2023-11-02
Payer: MEDICAID

## 2023-11-02 VITALS
HEIGHT: 66 IN | WEIGHT: 127 LBS | SYSTOLIC BLOOD PRESSURE: 123 MMHG | HEART RATE: 79 BPM | BODY MASS INDEX: 20.41 KG/M2 | DIASTOLIC BLOOD PRESSURE: 73 MMHG

## 2023-11-02 DIAGNOSIS — Z90.11 ACQUIRED ABSENCE OF RIGHT BREAST AND NIPPLE: ICD-10-CM

## 2023-11-02 DIAGNOSIS — R92.30 DENSE BREASTS, UNSPECIFIED: ICD-10-CM

## 2023-11-02 DIAGNOSIS — Z85.3 PERSONAL HISTORY OF MALIGNANT NEOPLASM OF BREAST: ICD-10-CM

## 2023-11-02 PROCEDURE — 99213 OFFICE O/P EST LOW 20 MIN: CPT

## 2023-11-02 PROCEDURE — 77063 BREAST TOMOSYNTHESIS BI: CPT | Mod: 26,52

## 2023-11-02 PROCEDURE — 77067 SCR MAMMO BI INCL CAD: CPT | Mod: 26,52

## 2023-11-02 PROCEDURE — 76641 ULTRASOUND BREAST COMPLETE: CPT | Mod: 26,LT

## 2023-11-03 ENCOUNTER — TRANSCRIPTION ENCOUNTER (OUTPATIENT)
Age: 43
End: 2023-11-03

## 2023-11-06 ENCOUNTER — TRANSCRIPTION ENCOUNTER (OUTPATIENT)
Age: 43
End: 2023-11-06

## 2023-11-16 ENCOUNTER — NON-APPOINTMENT (OUTPATIENT)
Age: 43
End: 2023-11-16

## 2023-11-16 ENCOUNTER — APPOINTMENT (OUTPATIENT)
Dept: INTERNAL MEDICINE | Facility: CLINIC | Age: 43
End: 2023-11-16
Payer: MEDICAID

## 2023-11-16 DIAGNOSIS — U07.1 COVID-19: ICD-10-CM

## 2023-11-16 PROCEDURE — 99213 OFFICE O/P EST LOW 20 MIN: CPT | Mod: 95

## 2023-12-07 ENCOUNTER — APPOINTMENT (OUTPATIENT)
Dept: FAMILY MEDICINE | Facility: CLINIC | Age: 43
End: 2023-12-07
Payer: MEDICAID

## 2023-12-07 VITALS
DIASTOLIC BLOOD PRESSURE: 75 MMHG | OXYGEN SATURATION: 99 % | TEMPERATURE: 97.2 F | HEIGHT: 66 IN | BODY MASS INDEX: 20.57 KG/M2 | HEART RATE: 79 BPM | SYSTOLIC BLOOD PRESSURE: 112 MMHG | WEIGHT: 128 LBS

## 2023-12-07 DIAGNOSIS — Z85.3 PERSONAL HISTORY OF MALIGNANT NEOPLASM OF BREAST: ICD-10-CM

## 2023-12-07 DIAGNOSIS — Z00.00 ENCOUNTER FOR GENERAL ADULT MEDICAL EXAMINATION W/OUT ABNORMAL FINDINGS: ICD-10-CM

## 2023-12-07 DIAGNOSIS — Z23 ENCOUNTER FOR IMMUNIZATION: ICD-10-CM

## 2023-12-07 PROCEDURE — 90471 IMMUNIZATION ADMIN: CPT

## 2023-12-07 PROCEDURE — 99396 PREV VISIT EST AGE 40-64: CPT | Mod: 25

## 2023-12-07 PROCEDURE — 36415 COLL VENOUS BLD VENIPUNCTURE: CPT

## 2023-12-07 PROCEDURE — 90686 IIV4 VACC NO PRSV 0.5 ML IM: CPT

## 2023-12-07 RX ORDER — NIRMATRELVIR AND RITONAVIR 300-100 MG
20 X 150 MG & KIT ORAL
Qty: 1 | Refills: 0 | Status: DISCONTINUED | COMMUNITY
Start: 2023-11-16 | End: 2023-12-07

## 2023-12-08 LAB
ALBUMIN SERPL ELPH-MCNC: 4.7 G/DL
ALP BLD-CCNC: 59 U/L
ALT SERPL-CCNC: 16 U/L
ANION GAP SERPL CALC-SCNC: 15 MMOL/L
AST SERPL-CCNC: 21 U/L
BASOPHILS # BLD AUTO: 0.05 K/UL
BASOPHILS NFR BLD AUTO: 1.2 %
BILIRUB SERPL-MCNC: 0.4 MG/DL
BUN SERPL-MCNC: 8 MG/DL
CALCIUM SERPL-MCNC: 9.7 MG/DL
CHLORIDE SERPL-SCNC: 105 MMOL/L
CHOLEST SERPL-MCNC: 166 MG/DL
CO2 SERPL-SCNC: 23 MMOL/L
CREAT SERPL-MCNC: 0.65 MG/DL
EGFR: 113 ML/MIN/1.73M2
EOSINOPHIL # BLD AUTO: 0.09 K/UL
EOSINOPHIL NFR BLD AUTO: 2.1 %
ESTIMATED AVERAGE GLUCOSE: 103 MG/DL
GLUCOSE SERPL-MCNC: 95 MG/DL
HBA1C MFR BLD HPLC: 5.2 %
HCT VFR BLD CALC: 36 %
HDLC SERPL-MCNC: 98 MG/DL
HGB BLD-MCNC: 11.7 G/DL
IMM GRANULOCYTES NFR BLD AUTO: 0.2 %
LDLC SERPL CALC-MCNC: 58 MG/DL
LYMPHOCYTES # BLD AUTO: 1.68 K/UL
LYMPHOCYTES NFR BLD AUTO: 39.1 %
MAN DIFF?: NORMAL
MCHC RBC-ENTMCNC: 32 PG
MCHC RBC-ENTMCNC: 32.5 GM/DL
MCV RBC AUTO: 98.4 FL
MONOCYTES # BLD AUTO: 0.31 K/UL
MONOCYTES NFR BLD AUTO: 7.2 %
NEUTROPHILS # BLD AUTO: 2.16 K/UL
NEUTROPHILS NFR BLD AUTO: 50.2 %
NONHDLC SERPL-MCNC: 67 MG/DL
PLATELET # BLD AUTO: 222 K/UL
POTASSIUM SERPL-SCNC: 4.7 MMOL/L
PROT SERPL-MCNC: 7.3 G/DL
RBC # BLD: 3.66 M/UL
RBC # FLD: 13.9 %
SODIUM SERPL-SCNC: 143 MMOL/L
TRIGL SERPL-MCNC: 43 MG/DL
TSH SERPL-ACNC: 0.67 UIU/ML
WBC # FLD AUTO: 4.3 K/UL

## 2024-05-14 ENCOUNTER — APPOINTMENT (OUTPATIENT)
Dept: FAMILY MEDICINE | Facility: CLINIC | Age: 44
End: 2024-05-14
Payer: MEDICAID

## 2024-05-14 VITALS
SYSTOLIC BLOOD PRESSURE: 108 MMHG | WEIGHT: 135 LBS | BODY MASS INDEX: 21.69 KG/M2 | HEIGHT: 66 IN | HEART RATE: 89 BPM | TEMPERATURE: 97.6 F | OXYGEN SATURATION: 96 % | DIASTOLIC BLOOD PRESSURE: 74 MMHG

## 2024-05-14 DIAGNOSIS — H57.02 ANISOCORIA: ICD-10-CM

## 2024-05-14 PROCEDURE — 99214 OFFICE O/P EST MOD 30 MIN: CPT

## 2024-05-14 NOTE — REVIEW OF SYSTEMS
[Vision Problems] : vision problems [Muscle Weakness] : no muscle weakness [Headache] : no headache [Memory Loss] : no memory loss [Unsteady Walking] : no ataxia

## 2024-05-14 NOTE — HISTORY OF PRESENT ILLNESS
[FreeTextEntry8] : 42 yo female PMH invasive ductal carcinoma s/p R nipple sparing mastectomy currently on tamoxifen presents for sudden double vision. Onset about 1 week ago. States double vision is in both eyes, feels like she can see two of everything. States towards evening time vision seems worse, would have to squint. Denies recent head/eye trauma, states she woke up with symptoms. Patient states yesterday morning her vision improved, still slightly out of focus and two of everything but overall much improved from prior. Denies any headaches, denies pressure behind eyes. Denies weakness of arms or legs, changes in sensation. Patient states she is able to hold her instrument normally.   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

## 2024-05-14 NOTE — PHYSICAL EXAM
[Normal Sclera/Conjunctiva] : normal sclera/conjunctiva [EOMI] : extraocular movements intact [Coordination Grossly Intact] : coordination grossly intact [No Focal Deficits] : no focal deficits [Normal Gait] : normal gait [Normal] : affect was normal and insight and judgment were intact [de-identified] : mild aniscoria L eye, pupils equally reactive [de-identified] : CN 2-12 intact

## 2024-05-14 NOTE — HEALTH RISK ASSESSMENT
[0] : 2) Feeling down, depressed, or hopeless: Not at all (0) [PHQ-2 Negative - No further assessment needed] : PHQ-2 Negative - No further assessment needed [Never] : Never [[id number]] : 0

## 2024-05-23 ENCOUNTER — NON-APPOINTMENT (OUTPATIENT)
Age: 44
End: 2024-05-23

## 2024-05-23 ENCOUNTER — APPOINTMENT (OUTPATIENT)
Dept: OPHTHALMOLOGY | Facility: CLINIC | Age: 44
End: 2024-05-23
Payer: MEDICAID

## 2024-05-23 PROCEDURE — 92004 COMPRE OPH EXAM NEW PT 1/>: CPT

## 2024-11-07 ENCOUNTER — APPOINTMENT (OUTPATIENT)
Dept: MAMMOGRAPHY | Facility: CLINIC | Age: 44
End: 2024-11-07
Payer: MEDICAID

## 2024-11-07 ENCOUNTER — OUTPATIENT (OUTPATIENT)
Dept: OUTPATIENT SERVICES | Facility: HOSPITAL | Age: 44
LOS: 1 days | End: 2024-11-07

## 2024-11-07 ENCOUNTER — RESULT REVIEW (OUTPATIENT)
Age: 44
End: 2024-11-07

## 2024-11-07 ENCOUNTER — APPOINTMENT (OUTPATIENT)
Dept: ULTRASOUND IMAGING | Facility: CLINIC | Age: 44
End: 2024-11-07
Payer: MEDICAID

## 2024-11-07 PROCEDURE — 76641 ULTRASOUND BREAST COMPLETE: CPT | Mod: 26,LT

## 2024-11-07 PROCEDURE — 77067 SCR MAMMO BI INCL CAD: CPT | Mod: 26,LT,52

## 2024-11-07 PROCEDURE — 77067 SCR MAMMO BI INCL CAD: CPT | Mod: 26,52

## 2024-11-07 PROCEDURE — 77063 BREAST TOMOSYNTHESIS BI: CPT | Mod: 26,52

## 2024-11-12 ENCOUNTER — APPOINTMENT (OUTPATIENT)
Dept: BREAST CENTER | Facility: CLINIC | Age: 44
End: 2024-11-12
Payer: MEDICAID

## 2024-11-12 VITALS — WEIGHT: 135 LBS | HEIGHT: 66 IN | BODY MASS INDEX: 21.69 KG/M2

## 2024-11-12 DIAGNOSIS — Z08 ENCOUNTER FOR FOLLOW-UP EXAMINATION AFTER COMPLETED TREATMENT FOR MALIGNANT NEOPLASM: ICD-10-CM

## 2024-11-12 DIAGNOSIS — Z85.3 PERSONAL HISTORY OF MALIGNANT NEOPLASM OF BREAST: ICD-10-CM

## 2024-11-12 DIAGNOSIS — Z90.11 ACQUIRED ABSENCE OF RIGHT BREAST AND NIPPLE: ICD-10-CM

## 2024-11-12 DIAGNOSIS — R92.8 OTHER ABNORMAL AND INCONCLUSIVE FINDINGS ON DIAGNOSTIC IMAGING OF BREAST: ICD-10-CM

## 2024-11-12 DIAGNOSIS — Z87.898 PERSONAL HISTORY OF OTHER SPECIFIED CONDITIONS: ICD-10-CM

## 2024-11-12 DIAGNOSIS — Z85.3 ENCOUNTER FOR FOLLOW-UP EXAMINATION AFTER COMPLETED TREATMENT FOR MALIGNANT NEOPLASM: ICD-10-CM

## 2024-11-12 DIAGNOSIS — R92.30 DENSE BREASTS, UNSPECIFIED: ICD-10-CM

## 2024-11-12 PROCEDURE — 99214 OFFICE O/P EST MOD 30 MIN: CPT

## 2024-12-11 ENCOUNTER — APPOINTMENT (OUTPATIENT)
Dept: FAMILY MEDICINE | Facility: CLINIC | Age: 44
End: 2024-12-11
Payer: MEDICAID

## 2024-12-11 VITALS
SYSTOLIC BLOOD PRESSURE: 114 MMHG | TEMPERATURE: 97.3 F | OXYGEN SATURATION: 94 % | HEART RATE: 77 BPM | HEIGHT: 66 IN | DIASTOLIC BLOOD PRESSURE: 77 MMHG | BODY MASS INDEX: 21.69 KG/M2 | WEIGHT: 135 LBS

## 2024-12-11 DIAGNOSIS — Z13.29 ENCOUNTER FOR SCREENING FOR OTHER SUSPECTED ENDOCRINE DISORDER: ICD-10-CM

## 2024-12-11 DIAGNOSIS — E55.9 VITAMIN D DEFICIENCY, UNSPECIFIED: ICD-10-CM

## 2024-12-11 DIAGNOSIS — Z13.228 ENCOUNTER FOR SCREENING FOR OTHER SUSPECTED ENDOCRINE DISORDER: ICD-10-CM

## 2024-12-11 DIAGNOSIS — Z13.0 ENCOUNTER FOR SCREENING FOR DISEASES OF THE BLOOD AND BLOOD-FORMING ORGANS AND CERTAIN DISORDERS INVOLVING THE IMMUNE MECHANISM: ICD-10-CM

## 2024-12-11 DIAGNOSIS — Z13.0 ENCOUNTER FOR SCREENING FOR OTHER SUSPECTED ENDOCRINE DISORDER: ICD-10-CM

## 2024-12-11 DIAGNOSIS — Z00.00 ENCOUNTER FOR GENERAL ADULT MEDICAL EXAMINATION W/OUT ABNORMAL FINDINGS: ICD-10-CM

## 2024-12-11 DIAGNOSIS — C50.911 MALIGNANT NEOPLASM OF UNSPECIFIED SITE OF RIGHT FEMALE BREAST: ICD-10-CM

## 2024-12-11 DIAGNOSIS — Z13.220 ENCOUNTER FOR SCREENING FOR LIPOID DISORDERS: ICD-10-CM

## 2024-12-11 DIAGNOSIS — Z12.4 ENCOUNTER FOR SCREENING FOR MALIGNANT NEOPLASM OF CERVIX: ICD-10-CM

## 2024-12-11 PROCEDURE — 99396 PREV VISIT EST AGE 40-64: CPT | Mod: 25

## 2024-12-11 PROCEDURE — 36415 COLL VENOUS BLD VENIPUNCTURE: CPT

## 2024-12-11 RX ORDER — CHOLECALCIFEROL (VITAMIN D3) 25 MCG
25 MCG TABLET ORAL
Qty: 90 | Refills: 1 | Status: ACTIVE | COMMUNITY
Start: 2024-12-11

## 2024-12-12 LAB
25(OH)D3 SERPL-MCNC: 42.6 NG/ML
ALBUMIN SERPL ELPH-MCNC: 4.5 G/DL
ALP BLD-CCNC: 68 U/L
ALT SERPL-CCNC: 13 U/L
ANION GAP SERPL CALC-SCNC: 13 MMOL/L
AST SERPL-CCNC: 20 U/L
BASOPHILS # BLD AUTO: 0.06 K/UL
BASOPHILS NFR BLD AUTO: 1.4 %
BILIRUB SERPL-MCNC: 0.4 MG/DL
BUN SERPL-MCNC: 15 MG/DL
CALCIUM SERPL-MCNC: 10 MG/DL
CHLORIDE SERPL-SCNC: 104 MMOL/L
CHOLEST SERPL-MCNC: 174 MG/DL
CO2 SERPL-SCNC: 22 MMOL/L
CREAT SERPL-MCNC: 0.76 MG/DL
EGFR: 100 ML/MIN/1.73M2
EOSINOPHIL # BLD AUTO: 0.12 K/UL
EOSINOPHIL NFR BLD AUTO: 2.8 %
ESTIMATED AVERAGE GLUCOSE: 97 MG/DL
GLUCOSE SERPL-MCNC: 92 MG/DL
HBA1C MFR BLD HPLC: 5 %
HCT VFR BLD CALC: 42.4 %
HDLC SERPL-MCNC: 99 MG/DL
HGB BLD-MCNC: 13.6 G/DL
IMM GRANULOCYTES NFR BLD AUTO: 0 %
LDLC SERPL CALC-MCNC: 60 MG/DL
LYMPHOCYTES # BLD AUTO: 1.8 K/UL
LYMPHOCYTES NFR BLD AUTO: 41.5 %
MAN DIFF?: NORMAL
MCHC RBC-ENTMCNC: 30.8 PG
MCHC RBC-ENTMCNC: 32.1 G/DL
MCV RBC AUTO: 95.9 FL
MONOCYTES # BLD AUTO: 0.35 K/UL
MONOCYTES NFR BLD AUTO: 8.1 %
NEUTROPHILS # BLD AUTO: 2.01 K/UL
NEUTROPHILS NFR BLD AUTO: 46.2 %
NONHDLC SERPL-MCNC: 75 MG/DL
PLATELET # BLD AUTO: 241 K/UL
POTASSIUM SERPL-SCNC: 4.8 MMOL/L
PROT SERPL-MCNC: 7.7 G/DL
RBC # BLD: 4.42 M/UL
RBC # FLD: 13.4 %
SODIUM SERPL-SCNC: 139 MMOL/L
TRIGL SERPL-MCNC: 89 MG/DL
TSH SERPL-ACNC: 1.49 UIU/ML
WBC # FLD AUTO: 4.34 K/UL

## 2024-12-16 ENCOUNTER — APPOINTMENT (OUTPATIENT)
Dept: OTOLARYNGOLOGY | Facility: CLINIC | Age: 44
End: 2024-12-16
Payer: MEDICAID

## 2024-12-16 VITALS
SYSTOLIC BLOOD PRESSURE: 107 MMHG | BODY MASS INDEX: 21.69 KG/M2 | TEMPERATURE: 97.3 F | WEIGHT: 135 LBS | HEART RATE: 95 BPM | DIASTOLIC BLOOD PRESSURE: 68 MMHG | OXYGEN SATURATION: 97 % | HEIGHT: 66 IN

## 2024-12-16 DIAGNOSIS — H61.23 IMPACTED CERUMEN, BILATERAL: ICD-10-CM

## 2024-12-16 DIAGNOSIS — H93.12 TINNITUS, LEFT EAR: ICD-10-CM

## 2024-12-16 PROCEDURE — 69210 REMOVE IMPACTED EAR WAX UNI: CPT

## 2024-12-16 PROCEDURE — 92550 TYMPANOMETRY & REFLEX THRESH: CPT | Mod: 52

## 2024-12-16 PROCEDURE — 99213 OFFICE O/P EST LOW 20 MIN: CPT | Mod: 25

## 2024-12-16 PROCEDURE — 92557 COMPREHENSIVE HEARING TEST: CPT

## 2024-12-18 ENCOUNTER — TRANSCRIPTION ENCOUNTER (OUTPATIENT)
Age: 44
End: 2024-12-18

## 2025-02-21 ENCOUNTER — NON-APPOINTMENT (OUTPATIENT)
Age: 45
End: 2025-02-21

## 2025-02-21 ENCOUNTER — OUTPATIENT (OUTPATIENT)
Dept: OUTPATIENT SERVICES | Facility: HOSPITAL | Age: 45
LOS: 1 days | End: 2025-02-21

## 2025-02-21 ENCOUNTER — APPOINTMENT (OUTPATIENT)
Dept: RADIOLOGY | Facility: CLINIC | Age: 45
End: 2025-02-21
Payer: MEDICAID

## 2025-02-21 PROCEDURE — 73562 X-RAY EXAM OF KNEE 3: CPT | Mod: 26,RT

## 2025-02-24 ENCOUNTER — APPOINTMENT (OUTPATIENT)
Dept: FAMILY MEDICINE | Facility: CLINIC | Age: 45
End: 2025-02-24